# Patient Record
Sex: FEMALE | Race: BLACK OR AFRICAN AMERICAN | NOT HISPANIC OR LATINO | URBAN - METROPOLITAN AREA
[De-identification: names, ages, dates, MRNs, and addresses within clinical notes are randomized per-mention and may not be internally consistent; named-entity substitution may affect disease eponyms.]

---

## 2018-04-05 ENCOUNTER — EMERGENCY (EMERGENCY)
Facility: HOSPITAL | Age: 58
LOS: 1 days | Discharge: HOME | End: 2018-04-05
Attending: EMERGENCY MEDICINE | Admitting: EMERGENCY MEDICINE
Payer: SELF-PAY

## 2018-04-05 VITALS
OXYGEN SATURATION: 93 % | RESPIRATION RATE: 21 BRPM | HEART RATE: 70 BPM | SYSTOLIC BLOOD PRESSURE: 127 MMHG | DIASTOLIC BLOOD PRESSURE: 71 MMHG | TEMPERATURE: 98 F

## 2018-04-05 VITALS
OXYGEN SATURATION: 99 % | DIASTOLIC BLOOD PRESSURE: 79 MMHG | WEIGHT: 164.91 LBS | TEMPERATURE: 98 F | HEART RATE: 76 BPM | SYSTOLIC BLOOD PRESSURE: 128 MMHG | RESPIRATION RATE: 22 BRPM

## 2018-04-05 DIAGNOSIS — Z79.899 OTHER LONG TERM (CURRENT) DRUG THERAPY: ICD-10-CM

## 2018-04-05 DIAGNOSIS — F17.210 NICOTINE DEPENDENCE, CIGARETTES, UNCOMPLICATED: ICD-10-CM

## 2018-04-05 DIAGNOSIS — Z79.1 LONG TERM (CURRENT) USE OF NON-STEROIDAL ANTI-INFLAMMATORIES (NSAID): ICD-10-CM

## 2018-04-05 DIAGNOSIS — J45.909 UNSPECIFIED ASTHMA, UNCOMPLICATED: ICD-10-CM

## 2018-04-05 DIAGNOSIS — Z79.2 LONG TERM (CURRENT) USE OF ANTIBIOTICS: ICD-10-CM

## 2018-04-05 DIAGNOSIS — J45.901 UNSPECIFIED ASTHMA WITH (ACUTE) EXACERBATION: ICD-10-CM

## 2018-04-05 DIAGNOSIS — Z79.52 LONG TERM (CURRENT) USE OF SYSTEMIC STEROIDS: ICD-10-CM

## 2018-04-05 PROCEDURE — 71046 X-RAY EXAM CHEST 2 VIEWS: CPT

## 2018-04-05 PROCEDURE — 99284 EMERGENCY DEPT VISIT MOD MDM: CPT

## 2018-04-05 PROCEDURE — 94640 AIRWAY INHALATION TREATMENT: CPT

## 2018-04-05 PROCEDURE — 99285 EMERGENCY DEPT VISIT HI MDM: CPT | Mod: 25

## 2018-04-05 PROCEDURE — 71046 X-RAY EXAM CHEST 2 VIEWS: CPT | Mod: 26

## 2018-04-05 RX ORDER — IPRATROPIUM BROMIDE 0.2 MG/ML
2.5 SOLUTION, NON-ORAL INHALATION
Qty: 1 | Refills: 0 | OUTPATIENT
Start: 2018-04-05

## 2018-04-05 RX ORDER — ALBUTEROL 90 UG/1
3 AEROSOL, METERED ORAL
Qty: 3 | Refills: 0 | OUTPATIENT
Start: 2018-04-05 | End: 2018-05-04

## 2018-04-05 RX ORDER — ALBUTEROL 90 UG/1
1 AEROSOL, METERED ORAL ONCE
Qty: 0 | Refills: 0 | Status: COMPLETED | OUTPATIENT
Start: 2018-04-05 | End: 2018-04-05

## 2018-04-05 RX ORDER — ALBUTEROL 90 UG/1
2.5 AEROSOL, METERED ORAL
Qty: 0 | Refills: 0 | Status: COMPLETED | OUTPATIENT
Start: 2018-04-05 | End: 2018-04-05

## 2018-04-05 RX ORDER — ALBUTEROL 90 UG/1
1 AEROSOL, METERED ORAL
Qty: 1 | Refills: 0 | OUTPATIENT
Start: 2018-04-05

## 2018-04-05 RX ORDER — IPRATROPIUM BROMIDE 0.2 MG/ML
500 SOLUTION, NON-ORAL INHALATION ONCE
Qty: 0 | Refills: 0 | Status: COMPLETED | OUTPATIENT
Start: 2018-04-05 | End: 2018-04-05

## 2018-04-05 RX ORDER — IPRATROPIUM BROMIDE 0.2 MG/ML
2.5 SOLUTION, NON-ORAL INHALATION
Qty: 3 | Refills: 0 | OUTPATIENT
Start: 2018-04-05

## 2018-04-05 RX ADMIN — ALBUTEROL 1 PUFF(S): 90 AEROSOL, METERED ORAL at 23:13

## 2018-04-05 RX ADMIN — Medication 500 MICROGRAM(S): at 21:47

## 2018-04-05 RX ADMIN — ALBUTEROL 2.5 MILLIGRAM(S): 90 AEROSOL, METERED ORAL at 21:46

## 2018-04-05 RX ADMIN — ALBUTEROL 2.5 MILLIGRAM(S): 90 AEROSOL, METERED ORAL at 22:36

## 2018-04-05 RX ADMIN — ALBUTEROL 2.5 MILLIGRAM(S): 90 AEROSOL, METERED ORAL at 22:37

## 2018-04-05 RX ADMIN — Medication 50 MILLIGRAM(S): at 22:37

## 2018-04-05 NOTE — ED PROVIDER NOTE - OBJECTIVE STATEMENT
asthma exacerbation last week or so + usually smokes cigarettes ( counseled ) no fever + uses nebs at home but has none States remote Hx intubation , 2 ed visits over the last year and steroids x 2 as well

## 2018-04-05 NOTE — ED ADULT NURSE NOTE - OBJECTIVE STATEMENT
pt to ER w/ report of asthma exacerbation for two days w/ cough productive of green phlegm.  Pt states her chest feels tight.  Pt denies n/v/f/c.  Some wheezes noted on auscultation.  Neb tx given.  Will continue to monitor.

## 2018-04-05 NOTE — ED ADULT NURSE REASSESSMENT NOTE - NS ED NURSE REASSESS COMMENT FT1
Pt is A&Ox3 at this time. Pt states "I just need to get this phlegm out".  Pt states she continues to feel a tightness in chest but feels slight relief after treatments".  Pt stable at this time and PA notified of results from breathing treatment.  Will continue to monitor.

## 2018-04-05 NOTE — ED PROVIDER NOTE - ATTENDING CONTRIBUTION TO CARE
57 yof pw wheezing, hx of asthma, active smoker, no fever.  trial of nebs at home w/o significant relief.  no hx of PE, no leg pain or swelling    agree w/ PA, pt speaking in full sentences, wheezing, no tachypnea, will trial of nebs, steroids, reassess

## 2018-04-05 NOTE — ED ADULT TRIAGE NOTE - CHIEF COMPLAINT QUOTE
pt arrives complaining of SOB asthma exacerbation x 2 days worse today using inhaler without relief also complains of chest burning discomfort productive cough green sputum. peak flow 200

## 2018-04-18 ENCOUNTER — INPATIENT (INPATIENT)
Facility: HOSPITAL | Age: 58
LOS: 1 days | Discharge: HOME | DRG: 203 | End: 2018-04-20
Attending: INTERNAL MEDICINE | Admitting: INTERNAL MEDICINE
Payer: SELF-PAY

## 2018-04-18 VITALS
DIASTOLIC BLOOD PRESSURE: 75 MMHG | TEMPERATURE: 98 F | OXYGEN SATURATION: 98 % | RESPIRATION RATE: 20 BRPM | SYSTOLIC BLOOD PRESSURE: 133 MMHG | HEART RATE: 74 BPM

## 2018-04-18 DIAGNOSIS — I10 ESSENTIAL (PRIMARY) HYPERTENSION: ICD-10-CM

## 2018-04-18 DIAGNOSIS — R63.8 OTHER SYMPTOMS AND SIGNS CONCERNING FOOD AND FLUID INTAKE: ICD-10-CM

## 2018-04-18 DIAGNOSIS — J45.901 UNSPECIFIED ASTHMA WITH (ACUTE) EXACERBATION: ICD-10-CM

## 2018-04-18 DIAGNOSIS — J44.9 CHRONIC OBSTRUCTIVE PULMONARY DISEASE, UNSPECIFIED: ICD-10-CM

## 2018-04-18 DIAGNOSIS — Z29.9 ENCOUNTER FOR PROPHYLACTIC MEASURES, UNSPECIFIED: ICD-10-CM

## 2018-04-18 DIAGNOSIS — Z98.51 TUBAL LIGATION STATUS: Chronic | ICD-10-CM

## 2018-04-18 LAB
ALBUMIN SERPL ELPH-MCNC: 4.1 G/DL — SIGNIFICANT CHANGE UP (ref 3.3–5)
ALP SERPL-CCNC: 62 U/L — SIGNIFICANT CHANGE UP (ref 40–120)
ALT FLD-CCNC: 18 U/L — SIGNIFICANT CHANGE UP (ref 10–45)
ANION GAP SERPL CALC-SCNC: 10 MMOL/L — SIGNIFICANT CHANGE UP (ref 5–17)
APTT BLD: 31.5 SEC — SIGNIFICANT CHANGE UP (ref 27.5–37.4)
AST SERPL-CCNC: 17 U/L — SIGNIFICANT CHANGE UP (ref 10–40)
BASOPHILS NFR BLD AUTO: 0.2 % — SIGNIFICANT CHANGE UP (ref 0–2)
BILIRUB SERPL-MCNC: 0.4 MG/DL — SIGNIFICANT CHANGE UP (ref 0.2–1.2)
BUN SERPL-MCNC: 9 MG/DL — SIGNIFICANT CHANGE UP (ref 7–23)
CALCIUM SERPL-MCNC: 9.2 MG/DL — SIGNIFICANT CHANGE UP (ref 8.4–10.5)
CHLORIDE SERPL-SCNC: 104 MMOL/L — SIGNIFICANT CHANGE UP (ref 96–108)
CK MB CFR SERPL CALC: 2.3 NG/ML — SIGNIFICANT CHANGE UP (ref 0–6.7)
CK SERPL-CCNC: 216 U/L — HIGH (ref 25–170)
CO2 SERPL-SCNC: 29 MMOL/L — SIGNIFICANT CHANGE UP (ref 22–31)
CREAT SERPL-MCNC: 0.91 MG/DL — SIGNIFICANT CHANGE UP (ref 0.5–1.3)
EOSINOPHIL NFR BLD AUTO: 2.1 % — SIGNIFICANT CHANGE UP (ref 0–6)
GAS PNL BLDV: SIGNIFICANT CHANGE UP
GLUCOSE BLDC GLUCOMTR-MCNC: 198 MG/DL — HIGH (ref 70–99)
GLUCOSE BLDC GLUCOMTR-MCNC: 280 MG/DL — HIGH (ref 70–99)
GLUCOSE BLDC GLUCOMTR-MCNC: 280 MG/DL — HIGH (ref 70–99)
GLUCOSE SERPL-MCNC: 104 MG/DL — HIGH (ref 70–99)
HCT VFR BLD CALC: 38.7 % — SIGNIFICANT CHANGE UP (ref 34.5–45)
HGB BLD-MCNC: 12.1 G/DL — SIGNIFICANT CHANGE UP (ref 11.5–15.5)
INR BLD: 0.97 — SIGNIFICANT CHANGE UP (ref 0.88–1.16)
LYMPHOCYTES # BLD AUTO: 33.5 % — SIGNIFICANT CHANGE UP (ref 13–44)
MCHC RBC-ENTMCNC: 21.3 PG — LOW (ref 27–34)
MCHC RBC-ENTMCNC: 31.3 G/DL — LOW (ref 32–36)
MCV RBC AUTO: 68.3 FL — LOW (ref 80–100)
MONOCYTES NFR BLD AUTO: 8.1 % — SIGNIFICANT CHANGE UP (ref 2–14)
NEUTROPHILS NFR BLD AUTO: 56.1 % — SIGNIFICANT CHANGE UP (ref 43–77)
PLATELET # BLD AUTO: 233 K/UL — SIGNIFICANT CHANGE UP (ref 150–400)
POTASSIUM SERPL-MCNC: 3.4 MMOL/L — LOW (ref 3.5–5.3)
POTASSIUM SERPL-SCNC: 3.4 MMOL/L — LOW (ref 3.5–5.3)
PROT SERPL-MCNC: 7.2 G/DL — SIGNIFICANT CHANGE UP (ref 6–8.3)
PROTHROM AB SERPL-ACNC: 10.8 SEC — SIGNIFICANT CHANGE UP (ref 9.8–12.7)
RBC # BLD: 5.67 M/UL — HIGH (ref 3.8–5.2)
RBC # FLD: 15.1 % — SIGNIFICANT CHANGE UP (ref 10.3–16.9)
SODIUM SERPL-SCNC: 143 MMOL/L — SIGNIFICANT CHANGE UP (ref 135–145)
TROPONIN T SERPL-MCNC: <0.01 NG/ML — SIGNIFICANT CHANGE UP (ref 0–0.01)
WBC # BLD: 6.1 K/UL — SIGNIFICANT CHANGE UP (ref 3.8–10.5)
WBC # FLD AUTO: 6.1 K/UL — SIGNIFICANT CHANGE UP (ref 3.8–10.5)

## 2018-04-18 PROCEDURE — 71046 X-RAY EXAM CHEST 2 VIEWS: CPT | Mod: 26

## 2018-04-18 PROCEDURE — 71275 CT ANGIOGRAPHY CHEST: CPT | Mod: 26

## 2018-04-18 PROCEDURE — 99223 1ST HOSP IP/OBS HIGH 75: CPT | Mod: GC

## 2018-04-18 PROCEDURE — 93010 ELECTROCARDIOGRAM REPORT: CPT

## 2018-04-18 PROCEDURE — 99285 EMERGENCY DEPT VISIT HI MDM: CPT | Mod: 25

## 2018-04-18 RX ORDER — GLUCAGON INJECTION, SOLUTION 0.5 MG/.1ML
1 INJECTION, SOLUTION SUBCUTANEOUS ONCE
Qty: 0 | Refills: 0 | Status: DISCONTINUED | OUTPATIENT
Start: 2018-04-18 | End: 2018-04-20

## 2018-04-18 RX ORDER — AZITHROMYCIN 500 MG/1
500 TABLET, FILM COATED ORAL ONCE
Qty: 0 | Refills: 0 | Status: COMPLETED | OUTPATIENT
Start: 2018-04-18 | End: 2018-04-18

## 2018-04-18 RX ORDER — SODIUM CHLORIDE 9 MG/ML
1000 INJECTION, SOLUTION INTRAVENOUS
Qty: 0 | Refills: 0 | Status: DISCONTINUED | OUTPATIENT
Start: 2018-04-18 | End: 2018-04-20

## 2018-04-18 RX ORDER — IPRATROPIUM/ALBUTEROL SULFATE 18-103MCG
3 AEROSOL WITH ADAPTER (GRAM) INHALATION ONCE
Qty: 0 | Refills: 0 | Status: COMPLETED | OUTPATIENT
Start: 2018-04-18 | End: 2018-04-18

## 2018-04-18 RX ORDER — POTASSIUM CHLORIDE 20 MEQ
40 PACKET (EA) ORAL ONCE
Qty: 0 | Refills: 0 | Status: COMPLETED | OUTPATIENT
Start: 2018-04-18 | End: 2018-04-18

## 2018-04-18 RX ORDER — ACETAMINOPHEN WITH CODEINE 300MG-30MG
1 TABLET ORAL ONCE
Qty: 0 | Refills: 0 | Status: DISCONTINUED | OUTPATIENT
Start: 2018-04-18 | End: 2018-04-18

## 2018-04-18 RX ORDER — KETOROLAC TROMETHAMINE 30 MG/ML
15 SYRINGE (ML) INJECTION ONCE
Qty: 0 | Refills: 0 | Status: DISCONTINUED | OUTPATIENT
Start: 2018-04-18 | End: 2018-04-18

## 2018-04-18 RX ORDER — DEXTROSE 50 % IN WATER 50 %
25 SYRINGE (ML) INTRAVENOUS ONCE
Qty: 0 | Refills: 0 | Status: DISCONTINUED | OUTPATIENT
Start: 2018-04-18 | End: 2018-04-20

## 2018-04-18 RX ORDER — DEXTROSE 50 % IN WATER 50 %
12.5 SYRINGE (ML) INTRAVENOUS ONCE
Qty: 0 | Refills: 0 | Status: DISCONTINUED | OUTPATIENT
Start: 2018-04-18 | End: 2018-04-20

## 2018-04-18 RX ORDER — AMLODIPINE BESYLATE 2.5 MG/1
5 TABLET ORAL DAILY
Qty: 0 | Refills: 0 | Status: DISCONTINUED | OUTPATIENT
Start: 2018-04-18 | End: 2018-04-20

## 2018-04-18 RX ORDER — ACETAMINOPHEN 500 MG
650 TABLET ORAL EVERY 6 HOURS
Qty: 0 | Refills: 0 | Status: DISCONTINUED | OUTPATIENT
Start: 2018-04-18 | End: 2018-04-20

## 2018-04-18 RX ORDER — ALBUTEROL 90 UG/1
5 AEROSOL, METERED ORAL ONCE
Qty: 0 | Refills: 0 | Status: DISCONTINUED | OUTPATIENT
Start: 2018-04-18 | End: 2018-04-18

## 2018-04-18 RX ORDER — BUDESONIDE AND FORMOTEROL FUMARATE DIHYDRATE 160; 4.5 UG/1; UG/1
2 AEROSOL RESPIRATORY (INHALATION)
Qty: 0 | Refills: 0 | Status: DISCONTINUED | OUTPATIENT
Start: 2018-04-18 | End: 2018-04-20

## 2018-04-18 RX ORDER — INSULIN LISPRO 100/ML
VIAL (ML) SUBCUTANEOUS
Qty: 0 | Refills: 0 | Status: DISCONTINUED | OUTPATIENT
Start: 2018-04-18 | End: 2018-04-20

## 2018-04-18 RX ORDER — IPRATROPIUM BROMIDE 0.2 MG/ML
500 SOLUTION, NON-ORAL INHALATION ONCE
Qty: 0 | Refills: 0 | Status: DISCONTINUED | OUTPATIENT
Start: 2018-04-18 | End: 2018-04-18

## 2018-04-18 RX ORDER — DEXTROSE 50 % IN WATER 50 %
1 SYRINGE (ML) INTRAVENOUS ONCE
Qty: 0 | Refills: 0 | Status: DISCONTINUED | OUTPATIENT
Start: 2018-04-18 | End: 2018-04-20

## 2018-04-18 RX ORDER — IPRATROPIUM/ALBUTEROL SULFATE 18-103MCG
3 AEROSOL WITH ADAPTER (GRAM) INHALATION
Qty: 0 | Refills: 0 | Status: COMPLETED | OUTPATIENT
Start: 2018-04-18 | End: 2018-04-18

## 2018-04-18 RX ORDER — IPRATROPIUM/ALBUTEROL SULFATE 18-103MCG
3 AEROSOL WITH ADAPTER (GRAM) INHALATION EVERY 4 HOURS
Qty: 0 | Refills: 0 | Status: DISCONTINUED | OUTPATIENT
Start: 2018-04-18 | End: 2018-04-20

## 2018-04-18 RX ADMIN — Medication 15 MILLIGRAM(S): at 13:38

## 2018-04-18 RX ADMIN — Medication 15 MILLIGRAM(S): at 13:09

## 2018-04-18 RX ADMIN — Medication 3 MILLILITER(S): at 09:06

## 2018-04-18 RX ADMIN — Medication 3 MILLILITER(S): at 09:29

## 2018-04-18 RX ADMIN — Medication 1 TABLET(S): at 13:08

## 2018-04-18 RX ADMIN — Medication 2: at 16:48

## 2018-04-18 RX ADMIN — Medication 3 MILLILITER(S): at 10:01

## 2018-04-18 RX ADMIN — Medication 40 MILLIEQUIVALENT(S): at 15:59

## 2018-04-18 RX ADMIN — Medication 3 MILLILITER(S): at 08:52

## 2018-04-18 RX ADMIN — AZITHROMYCIN 500 MILLIGRAM(S): 500 TABLET, FILM COATED ORAL at 10:51

## 2018-04-18 RX ADMIN — Medication 15 MILLIGRAM(S): at 08:52

## 2018-04-18 RX ADMIN — Medication 650 MILLIGRAM(S): at 19:44

## 2018-04-18 RX ADMIN — Medication 3 MILLILITER(S): at 16:49

## 2018-04-18 RX ADMIN — Medication 1 TABLET(S): at 13:38

## 2018-04-18 RX ADMIN — Medication 3 MILLILITER(S): at 14:15

## 2018-04-18 RX ADMIN — AMLODIPINE BESYLATE 5 MILLIGRAM(S): 2.5 TABLET ORAL at 15:59

## 2018-04-18 RX ADMIN — Medication 40 MILLIGRAM(S): at 15:59

## 2018-04-18 RX ADMIN — Medication 3 MILLILITER(S): at 23:19

## 2018-04-18 RX ADMIN — BUDESONIDE AND FORMOTEROL FUMARATE DIHYDRATE 2 PUFF(S): 160; 4.5 AEROSOL RESPIRATORY (INHALATION) at 23:19

## 2018-04-18 RX ADMIN — Medication 125 MILLIGRAM(S): at 09:06

## 2018-04-18 NOTE — PATIENT PROFILE ADULT. - NS TRANSFER PATIENT BELONGINGS
Counseling and Referral of Other Preventative  (Italic type indicates deductible and co-insurance are waived)    Patient Name: Leonarda Almazan  Today's Date: 8/14/2017      SERVICE LIMITATIONS RECOMMENDATION    Vaccines    · Pneumococcal (once after 65)    · Influenza (annually)    · Hepatitis B (if medium/high risk)    · Prevnar 13      Hepatitis B medium/high risk factors:       - End-stage renal disease       - Hemophiliacs who received Factor VII or         IX concentrates       - Clients of institutions for the mentally             retarded       - Persons who live in the same house as          a HepB carrier       - Homosexual men       - Illicit injectable drug abusers     Pneumococcal: Done, no repeat necessary     Influenza: N/A     Hepatitis B: N/A     Prevnar 13: N/A will discuss with pulmonary, may have received     Prostate cancer screening (annually to age 75)     Prostate specific antigen (PSA) Shared decision making with Provider. Sometimes a co-pay may be required if the patient decides to have this test. The USPSTF no longer recommends prostate cancer screening routinely in medicine: not to follow    Colorectal cancer screening (to age 75)    · Fecal occult blood test (annual)  · Flexible sigmoidoscopy (5y)  · Screening colonoscopy (10y)  · Barium enema   N/A    Diabetes self-management training (no USPSTF recommendations)  Requires referral by treating physician for patient with diabetes or renal disease. 10 hours of initial DSMT sessions of no less than 30 minutes each in a continuous 12-month period. 2 hours of follow-up DSMT in subsequent years.  N/A    Glaucoma screening (no USPSTF recommendation)  Diabetes mellitus, family history   , age 50 or over    American, age 65 or over  Recommend follow up with eye care professional regularly    Medical nutrition therapy for diabetes or renal disease (no recommended schedule)  Requires referral by treating physician for patient with  diabetes or renal disease or kidney transplant within the past 3 years.  Can be provided in same year as diabetes self-management training (DSMT), and CMS recommends medical nutrition therapy take place after DSMT. Up to 3 hours for initial year and 2 hours in subsequent years.  N/A    Cardiovascular screening blood tests (every 5 years)  · Fasting lipid panel  Order as a panel if possible  Done this year, repeat every year    Diabetes screening tests (at least every 3 years, Medicare covers annually or at 6-month intervals for prediabetic patients)  · Fasting blood sugar (FBS) or glucose tolerance test (GTT)  Patient must be diagnosed with one of the following:       - Hypertension       - Dyslipidemia       - Obesity (BMI 30kg/m2)       - Previous elevated impaired FBS or GTT       ... or any two of the following:       - Overweight (BMI 25 but <30)       - Family history of diabetes       - Age 65 or older       - History of gestational diabetes or birth of baby weighing more than 9 pounds  Done this year, repeat every year    Abdominal aortic aneurysm screening (once)  · Sonogram   Limited to patients who meet one of the following criteria:       - Men who are 65-75 years old and have smoked more than 100 cigarette in their lifetime       - Anyone with a family history of abdominal aortic aneurysm       - Anyone recommended for screening by the USPSTF  N/A    HIV screening (annually for increased risk patients)  · HIV-1 and HIV-2 by EIA, or [place], rapid antibody test or oral mucosa transudate  Patients must be at increased risk for HIV infection per USPSTF guidelines or pregnant. Tests covered annually for patient at increased risk or as requested by the patient. Pregnant patients may receive up to 3 tests during pregnancy.  Risks discussed, screening is not recommended    Smoking cessation counseling (up to 8 sessions per year)  Patients must be asymptomatic of tobacco-related conditions to receive as a  preventative service.  Non-smoker    Subsequent annual wellness visit  At least 12 months since last AWV  Return in one year     The following information is provided to all patients.  This information is to help you find resources for any of the problems found today that may be affecting your health:                Living healthy guide: www.[facility].[place].[facility]      Understanding Diabetes: www.diabetes.org      Eating healthy: www.cdc.gov/healthyweight      CDC home safety checklist: www.cdc.gov/steadi/patient.html      Agency on Aging: www.goea.[place].[facility]      Alcoholics anonymous (AA): www.aa.org      Physical Activity: www.lara.nih.gov/wa9qerw      Tobacco use: www.quitwithusla.org      Cell Phone/PDA (specify)/Clothing

## 2018-04-18 NOTE — H&P ADULT - ATTENDING COMMENTS
Admitted for acute asthma exacerbation, might be related to recent smoking cessation, although not consistent histories given to HS and myself.  Might have a component of underlying COPD, needs full PFT's as outpatient once episode has resolved.  C/w prednisone for now, nebs ATC and added symbicort.  BMI of 30 --> Obesity.  Monitor BP now that norvasc has been added. Admitted for acute asthma exacerbation, might be related to recent smoking cessation, although not consistent histories given to HS and myself.  Might have a component of underlying COPD, needs full PFT's as outpatient once episode has resolved.  C/w prednisone for now, nebs ATC and added symbicort. Check peak flow.  BMI of 30 --> Obesity.  Monitor BP now that norvasc has been added.

## 2018-04-18 NOTE — ED PROVIDER NOTE - ATTENDING CONTRIBUTION TO CARE
58F with PMH of asthma/ COPD, remote intubation, smoker p/w L sided chest pain and SOB 2 weeks, was here on 4/5 with sob/chest pain, diagnosed as asthma/copd exac, given pred and nebs with relief and dcd from ED but p/w persistent and worsening sxs despite completing steroids at home. No fevers/ chills. Pt AAO, tachypneic, scattered wheezes b/l with decreased BS b/l bases. Tachycardic, Abd: soft/NT. Labs/ studies noted. Pt with minimal improvement despite nebs/ steroids. Admitted for asthma/ COPD exacerbation.

## 2018-04-18 NOTE — ED ADULT TRIAGE NOTE - OTHER COMPLAINTS
Pt c/o chest pain when breathing. Reports being here a last week, sent home with steroids for her asthma.

## 2018-04-18 NOTE — H&P ADULT - HISTORY OF PRESENT ILLNESS
59yo F w/ PMHx of asthma, every day smoker (30 pack year smoking hx) presents for SOB and chest tightness. 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 Of note, she was recently seen in the ED for similar complaints on 4/5/18 and was dc'ed with 5 days of prednisone 50mg, ventolin HFA inhaler, but pt states that her SOB and chest tightness has not improved. She has been intubated once before many years ago. She is a current every-day smoker, who has smoked about a pack per day for 30 years. She denied ETOH use, drug use. She denies recent travel hx, no sick contacts. She denies ever being diagnosed with COPD. Her only medication is an albuterol inhaler, which she has been using everyday for the past 1-2 weeks due to her symptoms. Her PCP is Dr. Nirav Mayer (PCP), however her last appointment was in March 2017 and has repeatedly not responded to office calls and has missed appointments. Per her PCP's office, her medications in March 2017 included paroxetine 30mg qday, buspirone 10mg qday for major depressive d/o, albuterol inhaler.     In the ED, T 98.3F, HR 70, -144/77, RR 20, 92% RA --> 96% on 2L NC. Was given azithromycin 500mg x1, nebs x2, toradol 15mg IVP x2, and solumedrol 125mg x1. Labs unremarkable. CTA chest was performed which was neg for PE.

## 2018-04-18 NOTE — ED ADULT NURSE NOTE - OBJECTIVE STATEMENT
Pt is a 58 female complaining of chest pain 10/10 and sob for the past two weeks. Pt states that she was in the ED last week and told that her symptoms were from asthma. PT was given steroids. Pt states that she did not get better. Pt states that she has chest pain that travels to her back  when breathing. Pt states that she has a cough and is bringing up yellow sputum. Pt states that she is nauseas but thinks it is do to her coughing. Pt has been intubated in the past do to asthma. PT denies vomiting, diarrhea, fever chills. Will continue to monitor.

## 2018-04-18 NOTE — ED PROVIDER NOTE - CARE PLAN
Principal Discharge DX:	Chest pain, unspecified type Principal Discharge DX:	COPD (chronic obstructive pulmonary disease) with acute bronchitis  Secondary Diagnosis:	Chest pain, unspecified type

## 2018-04-18 NOTE — H&P ADULT - PROBLEM SELECTOR PLAN 1
Pt being admitted for asthma exacerbation with possible COPD exacerbation most likely 2/2 suboptimal therapy   - per PCP's office (Dr. Nirav Mayer), pt was last seen March 2017 and has missed appointments and not responded to phone calls and was last rx'ed albuterol in May 2017  - no sick contacts, no recent travel hx  - s/p 125mg solumedrol x1  - will continue 40mg prednisone qday, and taper as tolerates  - c/w duoneb q4h

## 2018-04-18 NOTE — ED PROVIDER NOTE - OBJECTIVE STATEMENT
58F with pmh of asthma, remote intubation, smoker    here with L sided chest pain x 1-2 weeks, was here on 4/5 with sob/chest pain, diagnosed as asthma/copd exac, given pred burst/nebs with relief, however pain did not subside despite completing her pred burst, and still with yellow sputum cough. Pain now worse with deep inspiration, coughing so came back to ED.     Denies f/c, abd pain, n/v/c/d, urinary complaints, leg swelling, recent travel/surgeries, hx of pe/dvt, hemoptysis, ivdu, hx of ca, ocp use.

## 2018-04-18 NOTE — H&P ADULT - PROBLEM SELECTOR PLAN 2
Pt has a 30 pack year smoking h/o and is a current ever-day smoker  - never diagnosed with COPD by PFTs  - will need outpt PFTs as pt most likely has COPD given symptoms and clinical hx  - will start symbicort for maintenance therapy

## 2018-04-18 NOTE — H&P ADULT - NSHPPHYSICALEXAM_GEN_ALL_CORE
.  VITAL SIGNS:  T(C): 37 (04-18-18 @ 14:09), Max: 37 (04-18-18 @ 14:09)  T(F): 98.6 (04-18-18 @ 14:09), Max: 98.6 (04-18-18 @ 14:09)  HR: 78 (04-18-18 @ 14:09) (69 - 78)  BP: 142/70 (04-18-18 @ 14:09) (120/86 - 149/77)  BP(mean): --  RR: 18 (04-18-18 @ 14:09) (18 - 20)  SpO2: 98% (04-18-18 @ 14:09) (92% - 98%)  Wt(kg): --    PHYSICAL EXAM:    Constitutional: WDWN resting comfortably in bed; NAD, exhaling with pursed lips intermittently, but speaking in full sentences without issue. no use of respiratory accessory muscles   Head: NC/AT  Eyes: PERRL, EOMI, anicteric sclera  ENT: no nasal discharge; uvula midline, no oropharyngeal erythema or exudates; MMM  Neck: supple; no JVD or thyromegaly  Respiratory: poor aeration throughout, no wheezes   Cardiac: +S1/S2; RRR; no M/R/G; PMI non-displaced  Gastrointestinal: soft, NT/ND; no rebound or guarding; +BSx4  Extremities: WWP, no clubbing or cyanosis; no peripheral edema  Musculoskeletal: NROM x4; no joint swelling, tenderness or erythema  Vascular: 2+ radial, DP pulses B/L  Dermatologic: skin warm, dry and intact; no rashes, wounds, or scars  Lymphatic: no submandibular or cervical LAD  Neurologic: AAOx3; CNII-XII grossly intact; no focal deficits  Psychiatric: affect and characteristics of appearance, verbalizations, behaviors are appropriate

## 2018-04-18 NOTE — H&P ADULT - ASSESSMENT
57yo F w/ PMHx of asthma, every day smoker (30 pack year smoking hx) presents for SOB and chest tightness, admitted for asthma exacerbation. 59yo F w/ PMHx of asthma, every day smoker (30 pack year smoking hx) presents for SOB and chest tightness, admitted for asthma exacerbation with possible concomitant COPD exacerbation.

## 2018-04-18 NOTE — ED PROVIDER NOTE - MEDICAL DECISION MAKING DETAILS
58F with pmh of asthma here with L sided chest pain and sob, appears anxious. Given continued smoking use will r/o acs, PNA, PTX, lower suspicion for PE at this time. Plan for EKG, CXR, pain control, labs with trop x1 and venous blood gas, duonebs, reassess.

## 2018-04-18 NOTE — ED ADULT NURSE REASSESSMENT NOTE - NS ED NURSE REASSESS COMMENT FT1
Pt resting in bed. Pt states that her pain level has decreased to 2/10. Vitals as per flow sheet. PA notified 2L nasal cannula applied. Will continue to monitor. Pending CTA.
Pts O2 sat 96% on 2L.

## 2018-04-18 NOTE — H&P ADULT - NSHPLABSRESULTS_GEN_ALL_CORE
.  LABS:                         12.1   6.1   )-----------( 233      ( 18 Apr 2018 08:43 )             38.7     04-18    143  |  104  |  9   ----------------------------<  104<H>  3.4<L>   |  29  |  0.91    Ca    9.2      18 Apr 2018 08:43    TPro  7.2  /  Alb  4.1  /  TBili  0.4  /  DBili  x   /  AST  17  /  ALT  18  /  AlkPhos  62  04-18    PT/INR - ( 18 Apr 2018 08:43 )   PT: 10.8 sec;   INR: 0.97          PTT - ( 18 Apr 2018 08:43 )  PTT:31.5 sec    CARDIAC MARKERS ( 18 Apr 2018 08:43 )  x     / <0.01 ng/mL / 216 U/L / x     / 2.3 ng/mL            RADIOLOGY, EKG & ADDITIONAL TESTS: Reviewed.

## 2018-04-19 LAB
ANION GAP SERPL CALC-SCNC: 11 MMOL/L — SIGNIFICANT CHANGE UP (ref 5–17)
BUN SERPL-MCNC: 12 MG/DL — SIGNIFICANT CHANGE UP (ref 7–23)
CALCIUM SERPL-MCNC: 9.3 MG/DL — SIGNIFICANT CHANGE UP (ref 8.4–10.5)
CHLORIDE SERPL-SCNC: 104 MMOL/L — SIGNIFICANT CHANGE UP (ref 96–108)
CHOLEST SERPL-MCNC: 186 MG/DL — SIGNIFICANT CHANGE UP (ref 10–199)
CO2 SERPL-SCNC: 26 MMOL/L — SIGNIFICANT CHANGE UP (ref 22–31)
CREAT SERPL-MCNC: 0.73 MG/DL — SIGNIFICANT CHANGE UP (ref 0.5–1.3)
GLUCOSE BLDC GLUCOMTR-MCNC: 151 MG/DL — HIGH (ref 70–99)
GLUCOSE BLDC GLUCOMTR-MCNC: 154 MG/DL — HIGH (ref 70–99)
GLUCOSE BLDC GLUCOMTR-MCNC: 156 MG/DL — HIGH (ref 70–99)
GLUCOSE BLDC GLUCOMTR-MCNC: 179 MG/DL — HIGH (ref 70–99)
GLUCOSE BLDC GLUCOMTR-MCNC: 183 MG/DL — HIGH (ref 70–99)
GLUCOSE SERPL-MCNC: 176 MG/DL — HIGH (ref 70–99)
HBA1C BLD-MCNC: 5.6 % — SIGNIFICANT CHANGE UP (ref 4–5.6)
HCT VFR BLD CALC: 35.1 % — SIGNIFICANT CHANGE UP (ref 34.5–45)
HCV AB S/CO SERPL IA: 0.24 S/CO — SIGNIFICANT CHANGE UP
HCV AB SERPL-IMP: SIGNIFICANT CHANGE UP
HDLC SERPL-MCNC: 52 MG/DL — SIGNIFICANT CHANGE UP (ref 40–125)
HGB BLD-MCNC: 11 G/DL — LOW (ref 11.5–15.5)
LIPID PNL WITH DIRECT LDL SERPL: 118 MG/DL — SIGNIFICANT CHANGE UP
MAGNESIUM SERPL-MCNC: 2.2 MG/DL — SIGNIFICANT CHANGE UP (ref 1.6–2.6)
MCHC RBC-ENTMCNC: 21.4 PG — LOW (ref 27–34)
MCHC RBC-ENTMCNC: 31.3 G/DL — LOW (ref 32–36)
MCV RBC AUTO: 68.2 FL — LOW (ref 80–100)
PLATELET # BLD AUTO: 236 K/UL — SIGNIFICANT CHANGE UP (ref 150–400)
POTASSIUM SERPL-MCNC: 4 MMOL/L — SIGNIFICANT CHANGE UP (ref 3.5–5.3)
POTASSIUM SERPL-SCNC: 4 MMOL/L — SIGNIFICANT CHANGE UP (ref 3.5–5.3)
RAPID RVP RESULT: SIGNIFICANT CHANGE UP
RBC # BLD: 5.15 M/UL — SIGNIFICANT CHANGE UP (ref 3.8–5.2)
RBC # FLD: 14.9 % — SIGNIFICANT CHANGE UP (ref 10.3–16.9)
SODIUM SERPL-SCNC: 141 MMOL/L — SIGNIFICANT CHANGE UP (ref 135–145)
TOTAL CHOLESTEROL/HDL RATIO MEASUREMENT: 3.6 RATIO — SIGNIFICANT CHANGE UP (ref 3.3–7.1)
TRIGL SERPL-MCNC: 81 MG/DL — SIGNIFICANT CHANGE UP (ref 10–149)
WBC # BLD: 16.4 K/UL — HIGH (ref 3.8–10.5)
WBC # FLD AUTO: 16.4 K/UL — HIGH (ref 3.8–10.5)

## 2018-04-19 PROCEDURE — 99233 SBSQ HOSP IP/OBS HIGH 50: CPT | Mod: GC

## 2018-04-19 RX ORDER — INSULIN LISPRO 100/ML
8 VIAL (ML) SUBCUTANEOUS ONCE
Qty: 0 | Refills: 0 | Status: DISCONTINUED | OUTPATIENT
Start: 2018-04-19 | End: 2018-04-19

## 2018-04-19 RX ADMIN — BUDESONIDE AND FORMOTEROL FUMARATE DIHYDRATE 2 PUFF(S): 160; 4.5 AEROSOL RESPIRATORY (INHALATION) at 09:20

## 2018-04-19 RX ADMIN — Medication 3 MILLILITER(S): at 22:11

## 2018-04-19 RX ADMIN — Medication 650 MILLIGRAM(S): at 02:55

## 2018-04-19 RX ADMIN — Medication 3 MILLILITER(S): at 14:00

## 2018-04-19 RX ADMIN — Medication 3 MILLILITER(S): at 11:43

## 2018-04-19 RX ADMIN — BUDESONIDE AND FORMOTEROL FUMARATE DIHYDRATE 2 PUFF(S): 160; 4.5 AEROSOL RESPIRATORY (INHALATION) at 21:18

## 2018-04-19 RX ADMIN — Medication 650 MILLIGRAM(S): at 11:42

## 2018-04-19 RX ADMIN — AMLODIPINE BESYLATE 5 MILLIGRAM(S): 2.5 TABLET ORAL at 07:06

## 2018-04-19 RX ADMIN — Medication 3 MILLILITER(S): at 02:55

## 2018-04-19 RX ADMIN — Medication 6: at 00:25

## 2018-04-19 RX ADMIN — Medication 2: at 09:25

## 2018-04-19 RX ADMIN — Medication 3 MILLILITER(S): at 07:06

## 2018-04-19 RX ADMIN — Medication 650 MILLIGRAM(S): at 17:31

## 2018-04-19 RX ADMIN — Medication 2: at 18:42

## 2018-04-19 RX ADMIN — Medication 2: at 13:20

## 2018-04-19 RX ADMIN — Medication 40 MILLIGRAM(S): at 07:06

## 2018-04-19 RX ADMIN — Medication 2: at 22:11

## 2018-04-19 RX ADMIN — Medication 3 MILLILITER(S): at 17:31

## 2018-04-19 NOTE — PROGRESS NOTE ADULT - PROBLEM SELECTOR PLAN 1
- patient reports this admission and approximately 5 ER visits in the last year for asthma exacerbations. She uses her rescue inhaler daily and is not on any controller medications.   - no recent infection, chest xray clear. RVP negative.   - 40mg prednisone qday x5 days  - c/w duoneb q4h PRN SOB - patient reports this admission and approximately 5 ER visits in the last year for asthma exacerbations. She uses her rescue inhaler daily and is not on any controller medications.   - no recent infection, chest xray clear. RVP negative.   - 40mg prednisone qday x5 days  - c/w duoneb q4h ATC

## 2018-04-19 NOTE — PROGRESS NOTE ADULT - SUBJECTIVE AND OBJECTIVE BOX
INTERVAL HPI/OVERNIGHT EVENTS: LACIE o/n. Patient this AM w/ peak expiratory flow of 250. Still reports subjective wheeze w/ mild improvement in breathing. ROS otherwise negative.     VITAL SIGNS:  T(F): 98.8 (04-19-18 @ 10:43)  HR: 79 (04-19-18 @ 10:43)  BP: 131/74 (04-19-18 @ 10:43)  RR: 18 (04-19-18 @ 10:43)  SpO2: 98% (04-19-18 @ 10:43)  Wt(kg): --    PHYSICAL EXAM:    Constitutional: NAD.   ENMT: no scleral icterus, no conjunctival injection. MMM.   Neck: trachea midline, no LAD/JVD  Respiratory: scattered end expiratory wheezing. On 2L NC. No accessory muscle use. Speaking in full sentences.   Cardiovascular: RRR. nl s1/s2. no m/r/g  Gastrointestinal: BS+. NT/ND. obese.   Extremities: WWP. no le edema  Vascular: 2+ radial/dp/pt  Neurological: a&ox3, no focal deficits.     MEDICATIONS  (STANDING):  ALBUTerol/ipratropium for Nebulization 3 milliLiter(s) Nebulizer every 4 hours  amLODIPine   Tablet 5 milliGRAM(s) Oral daily  buDESOnide 160 MICROgram(s)/formoterol 4.5 MICROgram(s) Inhaler 2 Puff(s) Inhalation two times a day  dextrose 5%. 1000 milliLiter(s) (50 mL/Hr) IV Continuous <Continuous>  dextrose 50% Injectable 12.5 Gram(s) IV Push once  dextrose 50% Injectable 25 Gram(s) IV Push once  dextrose 50% Injectable 25 Gram(s) IV Push once  insulin lispro (HumaLOG) corrective regimen sliding scale   SubCutaneous Before meals and at bedtime  predniSONE   Tablet 40 milliGRAM(s) Oral daily    MEDICATIONS  (PRN):  acetaminophen   Tablet 650 milliGRAM(s) Oral every 6 hours PRN moderate pain  dextrose Gel 1 Dose(s) Oral once PRN Blood Glucose LESS THAN 70 milliGRAM(s)/deciliter  glucagon  Injectable 1 milliGRAM(s) IntraMuscular once PRN Glucose LESS THAN 70 milligrams/deciliter      Allergies    No Known Allergies    Intolerances        LABS:                        11.0   16.4  )-----------( 236      ( 19 Apr 2018 05:39 )             35.1     04-19    141  |  104  |  12  ----------------------------<  176<H>  4.0   |  26  |  0.73    Ca    9.3      19 Apr 2018 05:39  Mg     2.2     04-19    TPro  7.2  /  Alb  4.1  /  TBili  0.4  /  DBili  x   /  AST  17  /  ALT  18  /  AlkPhos  62  04-18    PT/INR - ( 18 Apr 2018 08:43 )   PT: 10.8 sec;   INR: 0.97          PTT - ( 18 Apr 2018 08:43 )  PTT:31.5 sec      RADIOLOGY & ADDITIONAL TESTS:    Xray Chest 2 Views PA/Lat (04.18.18 @ 09:27)   Findings: Lungs clear. No pleural effusion. Cardiomediastinal silhouette,   bones and soft tissues unremarkable.    Impression: Normal.

## 2018-04-19 NOTE — PROGRESS NOTE ADULT - ATTENDING COMMENTS
Seen and examined by me this afternoon.  Doing better but still coughing up thick phlegm, RVP is negative.  C/w present treatment. Plan for discharge tomorrow with Pulmonary f/up as outpatient.  Most likely has underlying COPD, currently treating for Asthma exacerbation.  Pt states she quit smoking but she still smokes one cigarette every 3 days, so still consider as active smoker, counseled about complete smoking cessation, doesn't want nicotine patches at this time.  Tolerating norvasc for HTN.  Anticipate d/c home tomorrow with PMD and Pulmonary follow up. Seen and examined by me this afternoon.  Doing better but still coughing up thick phlegm, RVP is negative.  C/w present treatment. Plan for discharge tomorrow with Pulmonary f/up as outpatient.  Most likely has underlying COPD, currently treating for Asthma exacerbation.  Pt states she quit smoking but she still smokes one cigarette every 3 days, so still consider as active smoker, counseled about complete smoking cessation, doesn't want nicotine patches at this time.  Tolerating norvasc for HTN.  Leukocytosis likely steroid related.  Hypokalemia corrected.  Anticipate d/c home tomorrow with PMD and Pulmonary follow up.

## 2018-04-20 VITALS
OXYGEN SATURATION: 100 % | HEART RATE: 67 BPM | DIASTOLIC BLOOD PRESSURE: 74 MMHG | SYSTOLIC BLOOD PRESSURE: 116 MMHG | TEMPERATURE: 98 F | RESPIRATION RATE: 18 BRPM

## 2018-04-20 LAB
GLUCOSE BLDC GLUCOMTR-MCNC: 149 MG/DL — HIGH (ref 70–99)
HCT VFR BLD CALC: 34.8 % — SIGNIFICANT CHANGE UP (ref 34.5–45)
HGB BLD-MCNC: 10.9 G/DL — LOW (ref 11.5–15.5)
MCHC RBC-ENTMCNC: 21.6 PG — LOW (ref 27–34)
MCHC RBC-ENTMCNC: 31.3 G/DL — LOW (ref 32–36)
MCV RBC AUTO: 68.9 FL — LOW (ref 80–100)
PLATELET # BLD AUTO: 233 K/UL — SIGNIFICANT CHANGE UP (ref 150–400)
RBC # BLD: 5.05 M/UL — SIGNIFICANT CHANGE UP (ref 3.8–5.2)
RBC # FLD: 15 % — SIGNIFICANT CHANGE UP (ref 10.3–16.9)
WBC # BLD: 13.3 K/UL — HIGH (ref 3.8–10.5)
WBC # FLD AUTO: 13.3 K/UL — HIGH (ref 3.8–10.5)

## 2018-04-20 PROCEDURE — 99239 HOSP IP/OBS DSCHRG MGMT >30: CPT

## 2018-04-20 RX ORDER — AMLODIPINE BESYLATE 2.5 MG/1
1 TABLET ORAL
Qty: 30 | Refills: 0 | OUTPATIENT
Start: 2018-04-20 | End: 2018-05-19

## 2018-04-20 RX ORDER — BUDESONIDE AND FORMOTEROL FUMARATE DIHYDRATE 160; 4.5 UG/1; UG/1
2 AEROSOL RESPIRATORY (INHALATION)
Qty: 1 | Refills: 3 | OUTPATIENT
Start: 2018-04-20 | End: 2018-08-17

## 2018-04-20 RX ADMIN — Medication 3 MILLILITER(S): at 11:16

## 2018-04-20 RX ADMIN — AMLODIPINE BESYLATE 5 MILLIGRAM(S): 2.5 TABLET ORAL at 07:01

## 2018-04-20 RX ADMIN — Medication 40 MILLIGRAM(S): at 07:01

## 2018-04-20 RX ADMIN — Medication 200 MILLIGRAM(S): at 09:20

## 2018-04-20 RX ADMIN — Medication 3 MILLILITER(S): at 07:01

## 2018-04-20 RX ADMIN — BUDESONIDE AND FORMOTEROL FUMARATE DIHYDRATE 2 PUFF(S): 160; 4.5 AEROSOL RESPIRATORY (INHALATION) at 09:22

## 2018-04-20 RX ADMIN — Medication 3 MILLILITER(S): at 03:13

## 2018-04-20 NOTE — DISCHARGE NOTE ADULT - PATIENT PORTAL LINK FT
You can access the [facility] Patient Portal, offered by [facility], by registering with the following website: [email or web address]

## 2018-04-20 NOTE — DISCHARGE NOTE ADULT - MEDICATION SUMMARY - MEDICATIONS TO TAKE
I will START or STAY ON the medications listed below when I get home from the hospital:    predniSONE 20 mg oral tablet  -- 2 tab(s) by mouth once a day  -- Indication: For Asthma Exacerbation     Ventolin HFA 90 mcg/inh inhalation aerosol  -- 1 puff(s) inhaled 4 times a day MDD:PRN wheezing please dispense 1 pump  -- For inhalation only.  It is very important that you take or use this exactly as directed.  Do not skip doses or discontinue unless directed by your doctor.  Obtain medical advice before taking any non-prescription drugs as some may affect the action of this medication.  Shake well before use.    -- Indication: For COPD    albuterol 2.5 mg/3 mL (0.083%) inhalation solution  -- 3 milliliter(s) inhaled every 4 hours, As Needed -for bronchospasm MDD:dispense 1 box or 30 day supply as appropriate please  -- For inhalation only.  It is very important that you take or use this exactly as directed.  Do not skip doses or discontinue unless directed by your doctor.  Obtain medical advice before taking any non-prescription drugs as some may affect the action of this medication.    -- Indication: For COPD    Symbicort 160 mcg-4.5 mcg/inh inhalation aerosol  -- 2 puff(s) inhaled 2 times a day   -- Indication: For COPD (chronic obstructive pulmonary disease)    amLODIPine 5 mg oral tablet  -- 1 tab(s) by mouth once a day  -- Indication: For Essential hypertension

## 2018-04-20 NOTE — DISCHARGE NOTE ADULT - ADDITIONAL INSTRUCTIONS
Please call the office of Dr. Killian to schedule a follow up appointment and establish care. You will need to have your insurance information to schedule the appointment. Please make an appointment within 2 weeks.

## 2018-04-20 NOTE — DISCHARGE NOTE ADULT - HOSPITAL COURSE
59yo F w/ PMHx of asthma, every day smoker (30 pack year smoking hx) presented for SOB and chest tightness. Patient does not routinely follow with a pulmonologist and infrequently sees her primary care physician. She has had multiple ED visits in addition to this admission for asthma exacerbations. She is an active smoker. Chest xray and RVP unremarkable. Patient initially placed on 2L NC but now on RA. Never febrile. No hx of COPD but has not had PFTs performed. Patient started on PO prednisone taper and initiated tx with symbicort to treat likely comorbid COPD. Patient educated about smoking cessation and inhalers. Discharged home with 5 day prednisone taper, symbicort inhaler, and PRN albuterol inhaler. Patient noted to be persistently hypertensive during admission and started on amlodipine 5 mg. Attempted to make follow up appointment with Dr. Killian but patient did not have her insurance information. Patient instructed to call Dr. Killian's office when discharged and schedule appointment to establish care. Patient verbalized understanding of her treatment plan. Understanding assessed via teach back. Appropriate inhaler technique demonstrated at bedside. Patient discharged on room air w/ no signs of respiratory distress.

## 2018-04-20 NOTE — DISCHARGE NOTE ADULT - MEDICATION SUMMARY - MEDICATIONS TO STOP TAKING
I will STOP taking the medications listed below when I get home from the hospital:    amoxicillin-clavulanate 875 mg-125 mg oral tablet  -- 1 tab(s) by mouth every 12 hours  -- Finish all this medication unless otherwise directed by prescriber.  Take with food or milk.    ibuprofen 600 mg oral tablet  -- 1 tab(s) by mouth 4 times a day, As Needed - for moderate pain  -- Do not take this drug if you are pregnant.  It is very important that you take or use this exactly as directed.  Do not skip doses or discontinue unless directed by your doctor.  May cause drowsiness or dizziness.  Obtain medical advice before taking any non-prescription drugs as some may affect the action of this medication.  Take with food or milk.    ipratropium 500 mcg/2.5 mL inhalation solution  -- 2.5 milliliter(s) inhaled 4 times a day MDD:please dispense 1 box for PRN use QID wheezing  -- For inhalation only.  It is very important that you take or use this exactly as directed.  Do not skip doses or discontinue unless directed by your doctor.    predniSONE 50 mg oral tablet  -- 1 tab(s) by mouth once a day  -- It is very important that you take or use this exactly as directed.  Do not skip doses or discontinue unless directed by your doctor.  Obtain medical advice before taking any non-prescription drugs as some may affect the action of this medication.  Take with food or milk.

## 2018-04-20 NOTE — DISCHARGE NOTE ADULT - CARE PROVIDER_API CALL
Chela Killian), Critical Care Medicine; Pulmonary Disease  [address] [id number]  Phone: [phone]  Fax: [phone]

## 2018-04-20 NOTE — DISCHARGE NOTE ADULT - PLAN OF CARE
Improvement in your breathing You were admitted for an asthma exacerbation. There were no signs of infection on your chest xray or on viral studies we performed. It is likely your recurrent asthma exacerbations are due to your continued smoking. Smoking cessation will improve your lung function and help you to better control your asthma and possible COPD. Please take all of your inhalers as prescribed. You do not have a formal diagnosis You do not have a formal diagnosis of COPD but it is likely given your smoking history that you have underlying COPD in addition to your asthma. You will need to call the office of Dr. Killian when you are discharged with your insurance information to schedule an appointment within two weeks. Please take your inhalers as prescribed. You were diagnosed with high blood pressure during this admission. It is very important to control your blood pressure to prevent worsening of heart diease and vascular disease. We started a medication while you were hospitalized, amlodipine, to control your blood pressure. Take it once a day. Follow up with your primary care doctor for further management of your blood pressure.

## 2018-04-20 NOTE — DISCHARGE NOTE ADULT - CARE PLAN
Principal Discharge DX:	Persistent asthma with acute exacerbation, unspecified asthma severity  Goal:	Improvement in your breathing  Assessment and plan of treatment:	You were admitted for an asthma exacerbation. There were no signs of infection on your chest xray or on viral studies we performed. It is likely your recurrent asthma exacerbations are due to your continued smoking. Smoking cessation will improve your lung function and help you to better control your asthma and possible COPD. Please take all of your inhalers as prescribed.  Secondary Diagnosis:	COPD (chronic obstructive pulmonary disease)  Assessment and plan of treatment:	You do not have a formal diagnosis  Secondary Diagnosis:	Essential hypertension Principal Discharge DX:	Persistent asthma with acute exacerbation, unspecified asthma severity  Goal:	Improvement in your breathing  Assessment and plan of treatment:	You were admitted for an asthma exacerbation. There were no signs of infection on your chest xray or on viral studies we performed. It is likely your recurrent asthma exacerbations are due to your continued smoking. Smoking cessation will improve your lung function and help you to better control your asthma and possible COPD. Please take all of your inhalers as prescribed.  Secondary Diagnosis:	COPD (chronic obstructive pulmonary disease)  Assessment and plan of treatment:	You do not have a formal diagnosis of COPD but it is likely given your smoking history that you have underlying COPD in addition to your asthma. You will need to call the office of Dr. Killian when you are discharged with your insurance information to schedule an appointment within two weeks. Please take your inhalers as prescribed.  Secondary Diagnosis:	Essential hypertension  Assessment and plan of treatment:	You were diagnosed with high blood pressure during this admission. It is very important to control your blood pressure to prevent worsening of heart diease and vascular disease. We started a medication while you were hospitalized, amlodipine, to control your blood pressure. Take it once a day. Follow up with your primary care doctor for further management of your blood pressure.

## 2018-04-23 DIAGNOSIS — J45.901 UNSPECIFIED ASTHMA WITH (ACUTE) EXACERBATION: ICD-10-CM

## 2018-04-23 DIAGNOSIS — F17.210 NICOTINE DEPENDENCE, CIGARETTES, UNCOMPLICATED: ICD-10-CM

## 2018-04-23 DIAGNOSIS — E87.6 HYPOKALEMIA: ICD-10-CM

## 2018-04-23 DIAGNOSIS — I10 ESSENTIAL (PRIMARY) HYPERTENSION: ICD-10-CM

## 2018-04-23 DIAGNOSIS — T38.0X5A ADVERSE EFFECT OF GLUCOCORTICOIDS AND SYNTHETIC ANALOGUES, INITIAL ENCOUNTER: ICD-10-CM

## 2018-04-23 DIAGNOSIS — E66.9 OBESITY, UNSPECIFIED: ICD-10-CM

## 2018-04-23 DIAGNOSIS — J44.9 CHRONIC OBSTRUCTIVE PULMONARY DISEASE, UNSPECIFIED: ICD-10-CM

## 2018-04-23 DIAGNOSIS — D72.829 ELEVATED WHITE BLOOD CELL COUNT, UNSPECIFIED: ICD-10-CM

## 2018-04-23 DIAGNOSIS — Z79.52 LONG TERM (CURRENT) USE OF SYSTEMIC STEROIDS: ICD-10-CM

## 2018-05-23 PROCEDURE — 85027 COMPLETE CBC AUTOMATED: CPT

## 2018-05-23 PROCEDURE — 83735 ASSAY OF MAGNESIUM: CPT

## 2018-05-23 PROCEDURE — 80061 LIPID PANEL: CPT

## 2018-05-23 PROCEDURE — 82553 CREATINE MB FRACTION: CPT

## 2018-05-23 PROCEDURE — 36415 COLL VENOUS BLD VENIPUNCTURE: CPT

## 2018-05-23 PROCEDURE — 83036 HEMOGLOBIN GLYCOSYLATED A1C: CPT

## 2018-05-23 PROCEDURE — 93005 ELECTROCARDIOGRAM TRACING: CPT

## 2018-05-23 PROCEDURE — 85610 PROTHROMBIN TIME: CPT

## 2018-05-23 PROCEDURE — 84132 ASSAY OF SERUM POTASSIUM: CPT

## 2018-05-23 PROCEDURE — 87798 DETECT AGENT NOS DNA AMP: CPT

## 2018-05-23 PROCEDURE — 82330 ASSAY OF CALCIUM: CPT

## 2018-05-23 PROCEDURE — 80048 BASIC METABOLIC PNL TOTAL CA: CPT

## 2018-05-23 PROCEDURE — 94640 AIRWAY INHALATION TREATMENT: CPT

## 2018-05-23 PROCEDURE — 82962 GLUCOSE BLOOD TEST: CPT

## 2018-05-23 PROCEDURE — 71275 CT ANGIOGRAPHY CHEST: CPT

## 2018-05-23 PROCEDURE — 87486 CHLMYD PNEUM DNA AMP PROBE: CPT

## 2018-05-23 PROCEDURE — 86803 HEPATITIS C AB TEST: CPT

## 2018-05-23 PROCEDURE — 87581 M.PNEUMON DNA AMP PROBE: CPT

## 2018-05-23 PROCEDURE — 80053 COMPREHEN METABOLIC PANEL: CPT

## 2018-05-23 PROCEDURE — 96375 TX/PRO/DX INJ NEW DRUG ADDON: CPT | Mod: XU

## 2018-05-23 PROCEDURE — 82550 ASSAY OF CK (CPK): CPT

## 2018-05-23 PROCEDURE — 82803 BLOOD GASES ANY COMBINATION: CPT

## 2018-05-23 PROCEDURE — 84295 ASSAY OF SERUM SODIUM: CPT

## 2018-05-23 PROCEDURE — 85025 COMPLETE CBC W/AUTO DIFF WBC: CPT

## 2018-05-23 PROCEDURE — 85730 THROMBOPLASTIN TIME PARTIAL: CPT

## 2018-05-23 PROCEDURE — 71046 X-RAY EXAM CHEST 2 VIEWS: CPT

## 2018-05-23 PROCEDURE — 96374 THER/PROPH/DIAG INJ IV PUSH: CPT | Mod: XU

## 2018-05-23 PROCEDURE — 99285 EMERGENCY DEPT VISIT HI MDM: CPT | Mod: 25

## 2018-05-23 PROCEDURE — 96376 TX/PRO/DX INJ SAME DRUG ADON: CPT | Mod: XU

## 2018-05-23 PROCEDURE — 87633 RESP VIRUS 12-25 TARGETS: CPT

## 2018-05-23 PROCEDURE — 84484 ASSAY OF TROPONIN QUANT: CPT

## 2018-08-08 ENCOUNTER — EMERGENCY (EMERGENCY)
Facility: HOSPITAL | Age: 58
LOS: 1 days | Discharge: HOME | End: 2018-08-08
Admitting: EMERGENCY MEDICINE
Payer: SELF-PAY

## 2018-08-08 VITALS
RESPIRATION RATE: 16 BRPM | DIASTOLIC BLOOD PRESSURE: 73 MMHG | HEART RATE: 78 BPM | TEMPERATURE: 98 F | OXYGEN SATURATION: 95 % | WEIGHT: 145.06 LBS | SYSTOLIC BLOOD PRESSURE: 120 MMHG

## 2018-08-08 DIAGNOSIS — Z98.51 TUBAL LIGATION STATUS: Chronic | ICD-10-CM

## 2018-08-08 PROCEDURE — 99283 EMERGENCY DEPT VISIT LOW MDM: CPT | Mod: 25

## 2018-08-08 PROCEDURE — 73080 X-RAY EXAM OF ELBOW: CPT

## 2018-08-08 PROCEDURE — 99283 EMERGENCY DEPT VISIT LOW MDM: CPT

## 2018-08-08 PROCEDURE — 73080 X-RAY EXAM OF ELBOW: CPT | Mod: 26,RT

## 2018-08-08 NOTE — ED PROVIDER NOTE - MEDICAL DECISION MAKING DETAILS
x ray results discussed ( olecranon spur with avulsion fx noted )  with supportive care and ortho f/u stressed

## 2018-08-08 NOTE — ED ADULT TRIAGE NOTE - CHIEF COMPLAINT QUOTE
pt complaining of 8/10 right elbow/ arm pain x 8 hrs when a bin full of groceries fell on her arm while at work. No obvious deformity, +radial pulse

## 2018-08-08 NOTE — ED ADULT NURSE NOTE - NSIMPLEMENTINTERV_GEN_ALL_ED
Implemented All Universal Safety Interventions:  [place] to call system. Call bell, personal items and telephone within reach. Instruct patient to call for assistance. Room bathroom lighting operational. Non-slip footwear when patient is off stretcher. Physically safe environment: no spills, clutter or unnecessary equipment. Stretcher in lowest position, wheels locked, appropriate side rails in place.

## 2018-08-08 NOTE — ED PROVIDER NOTE - PHYSICAL EXAMINATION
RUE with all compartments soft skin intact warm and pink pulses palpable and regular cap refill brisk ROM decreased at elbow with  15 -20 degree extensor lag -> 100 flexion 70/90 sup/pronation with tenderness at all extremes no gross deformity + RUM nerves intact M/S  FROM at shoulder / wrist/ hand

## 2018-08-08 NOTE — ED ADULT NURSE NOTE - CHPI ED NUR SYMPTOMS NEG
no abrasion/no weakness/no difficulty bearing weight/no tingling/no fever/no stiffness/no back pain/no numbness/no bruising

## 2018-08-08 NOTE — ED PROVIDER NOTE - OBJECTIVE STATEMENT
Right elbow pain as items fell onto her tonight and feels stiffness and difficulty with ROM. Denies numbness or tingling. no other injury

## 2018-08-12 DIAGNOSIS — M25.521 PAIN IN RIGHT ELBOW: ICD-10-CM

## 2018-08-12 DIAGNOSIS — Z79.899 OTHER LONG TERM (CURRENT) DRUG THERAPY: ICD-10-CM

## 2018-08-12 DIAGNOSIS — J45.909 UNSPECIFIED ASTHMA, UNCOMPLICATED: ICD-10-CM

## 2018-08-12 DIAGNOSIS — Z79.52 LONG TERM (CURRENT) USE OF SYSTEMIC STEROIDS: ICD-10-CM

## 2019-12-27 NOTE — DISCHARGE NOTE ADULT - ABILITY TO HEAR (WITH HEARING AID OR HEARING APPLIANCE IF NORMALLY USED):
Anesthesia Release from PACU Note    Patient: Samuel Miner    Procedure(s) Performed: Procedure(s) (LRB):  ESOPHAGOGASTRODUODENOSCOPY (EGD) (N/A)    Anesthesia type: GEN    Post pain: Adequate analgesia reported    Post assessment: no apparent anesthetic complications, tolerated procedure well and no evidence of recall    Post vital signs: BP (!) 98/58   Pulse (!) 124   Temp 36.7 °C (98.1 °F) (Temporal)   Resp 20   Wt 19.2 kg (42 lb 3.5 oz)   SpO2 98%     Level of consciousness: awake, alert and oriented    Nausea/Vomiting: no nausea/no vomiting    Complications: none    Airway Patency: patent    Respiratory: unassisted, spontaneous ventilation, room air    Cardiovascular: stable and blood pressure at baseline    Hydration: euvolemic    
Adequate: hears normal conversation without difficulty

## 2020-02-23 ENCOUNTER — EMERGENCY (EMERGENCY)
Facility: HOSPITAL | Age: 60
LOS: 1 days | Discharge: HOME | End: 2020-02-23
Admitting: EMERGENCY MEDICINE
Payer: SELF-PAY

## 2020-02-23 VITALS
SYSTOLIC BLOOD PRESSURE: 115 MMHG | RESPIRATION RATE: 18 BRPM | OXYGEN SATURATION: 100 % | DIASTOLIC BLOOD PRESSURE: 78 MMHG | TEMPERATURE: 98 F | HEART RATE: 78 BPM

## 2020-02-23 DIAGNOSIS — Z98.51 TUBAL LIGATION STATUS: Chronic | ICD-10-CM

## 2020-02-23 PROCEDURE — 99283 EMERGENCY DEPT VISIT LOW MDM: CPT | Mod: 25

## 2020-02-23 PROCEDURE — 73130 X-RAY EXAM OF HAND: CPT | Mod: 26

## 2020-02-23 PROCEDURE — 73130 X-RAY EXAM OF HAND: CPT | Mod: 26,LT

## 2020-02-23 PROCEDURE — 99284 EMERGENCY DEPT VISIT MOD MDM: CPT | Mod: 25

## 2020-02-23 PROCEDURE — 73130 X-RAY EXAM OF HAND: CPT

## 2020-02-23 NOTE — ED PROVIDER NOTE - CLINICAL SUMMARY MEDICAL DECISION MAKING FREE TEXT BOX
59y F presents to the ED with left hand pain/swelling. Patient states she hit it while driving at work today and a bump appeared. On physical exam patient appears well non toxic, has mildly tender cyst like structure over dorsum of left wrist over scaphoid bone. Discussed with hand surgeon who saw patient who believed ganglion cyst, not necessarily related to the trauma, and to follow up with hand surgeon for further evaluation. 59y F presents to the ED with left hand pain/swelling. Patient states she hit it while driving at work today and a bump appeared. On physical exam patient appears well non toxic, has mildly tender cyst like structure over dorsum of left wrist over scaphoid bone. Discussed with hand surgeon who saw patient who believed ganglion cyst, not necessarily related to the trauma, and to follow up with hand surgeon for further evaluation. Per my interpretation, imaging negative for fracture. o Patient is advised to return if any worsening of symptoms. Patient understands indications to return to the ER. Patient is agreeable with this plan.

## 2020-02-23 NOTE — ED ADULT NURSE NOTE - OBJECTIVE STATEMENT
59y F, A&ox3, pmh of Asthma, presents to ed c/o left hand injury at work today. Reports banged left hand on truck and since then c/o tingling sensation to arm. No obvious deformity noted. sensation intact. +radial pulse, cap refill <3. +ROM, strength equal bilateral.

## 2020-02-23 NOTE — ED PROVIDER NOTE - OBJECTIVE STATEMENT
59y F presents to the ED with complains of left hand pain. Patient states she banged her hand on the door at work and noticed a tender bump on the left hand. Patient states it was never there before. 59y F presents to the ED with complains of left hand pain. Patient states she banged her hand on the door at work and noticed a tender bump on the left hand. Patient states it was never there before. no numbness tingling or weakness.

## 2020-02-23 NOTE — ED PROVIDER NOTE - PHYSICAL EXAMINATION
General survey: Patient is well developed and well nourished. Patient is lying in stretcher, not diaphoretic and does not appear in acute distress.    Skin: Warm dry and intact. No note of any edema, pallor, jaundice, erythema, ecchymosis, or purpura    msk: ttp cyst like structure dorsum left hand overlying scaphoid. No evidence of gross deformity. Full ROM of fingers, flexion, extension, abduction and adduction. Intact thumb opposition. No TTP phalanges, metacarpals, carpals, styloid proccess, or snuffbox tenderness. No evidence of thenar or hypothenar atrophy. Capillary refill <2 sec.

## 2020-02-23 NOTE — ED PROVIDER NOTE - NSFOLLOWUPINSTRUCTIONS_ED_ALL_ED_FT
please use ice on the area    please follow up with your primary care doctor    please reach out to [place] (phone number included for follow up with hand surgeon)    Ganglion Cyst     A ganglion cyst is a non-cancerous, fluid-filled lump that occurs near a joint or tendon. The cyst grows out of a joint or the lining of a tendon. Ganglion cysts most often develop in the hand or wrist, but they can also develop in the shoulder, elbow, hip, knee, ankle, or foot.  Ganglion cysts are ball-shaped or egg-shaped. Their size can range from the size of a pea to larger than a grape. Increased activity may cause the cyst to get bigger because more fluid starts to build up.  What are the causes?  The exact cause of this condition is not known, but it may be related to:  Inflammation or irritation around the joint.An injury.Repetitive movements or overuse.Arthritis.What increases the risk?  You are more likely to develop this condition if:  You are a woman.You are 15–40 years old.What are the signs or symptoms?  The main symptom of this condition is a lump. It most often appears on the hand or wrist. In many cases, there are no other symptoms, but a cyst can sometimes cause:  Tingling.Pain.Numbness.Muscle weakness.Weak .Less range of motion in a joint.How is this diagnosed?  Ganglion cysts are usually diagnosed based on a physical exam. Your health care provider will feel the lump and may shine a light next to it. If it is a ganglion cyst, the light will likely shine through it.  Your health care provider may order an X-ray, ultrasound, or MRI to rule out other conditions.  How is this treated?  Ganglion cysts often go away on their own without treatment. If you have pain or other symptoms, treatment may be needed. Treatment is also needed if the ganglion cyst limits your movement or if it gets infected. Treatment may include:  Wearing a brace or splint on your wrist or finger.Taking anti-inflammatory medicine.Having fluid drained from the lump with a needle (aspiration).Getting a steroid injected into the joint.Having surgery to remove the ganglion cyst.Placing a pad on your shoe or wearing shoes that will not rub against the cyst if it is on your foot.Follow these instructions at home:  Do not press on the ganglion cyst, poke it with a needle, or hit it.Take over-the-counter and prescription medicines only as told by your health care provider.If you have a brace or splint:   Wear it as told by your health care provider.Remove it as told by your health care provider. Ask if you need to remove it when you take a shower or a bath.Watch your ganglion cyst for any changes.Keep all follow-up visits as told by your health care provider. This is important.Contact a health care provider if:  Your ganglion cyst becomes larger or more painful.You have pus coming from the lump.You have weakness or numbness in the affected area.You have a fever or chills.Get help right away if:  You have a fever and have any of these in the cyst area:  Increased redness.Red streaks.Swelling.Summary  A ganglion cyst is a non-cancerous, fluid-filled lump that occurs near a joint or tendon.Ganglion cysts most often develop in the hand or wrist, but they can also develop in the shoulder, elbow, hip, knee, ankle, or foot.Ganglion cysts often go away on their own without treatment.This information is not intended to replace advice given to you by your health care provider. Make sure you discuss any questions you have with your health care provider.    Document Released: 12/15/2001 Document Revised: 08/17/2018 Document Reviewed: 08/17/2018  Elsevier Interactive Patient Education © 2020 Elsevier Inc[employer]

## 2020-02-23 NOTE — ED PROVIDER NOTE - PATIENT PORTAL LINK FT
You can access the FollowMyHealth Patient Portal offered by [facility] by registering at the following website: [email or web address]. By joining [employer]’s FollowMyHealth portal, you will also be able to view your health information using other applications (apps) compatible with our system.

## 2020-02-23 NOTE — ED ADULT NURSE NOTE - CHPI ED NUR SYMPTOMS NEG
no stiffness/no deformity/no weakness/no numbness/no back pain/no difficulty bearing weight/no fever/no abrasion/no bruising
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
## 2020-02-28 DIAGNOSIS — M79.642 PAIN IN LEFT HAND: ICD-10-CM

## 2020-02-28 DIAGNOSIS — M25.432 EFFUSION, LEFT WRIST: ICD-10-CM
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
## 2020-05-20 NOTE — PATIENT PROFILE ADULT. - CENTRAL VENOUS CATHETER
The patient location is:  Patient Home in [place]  The chief complaint leading to consultation is: morbid obesity  Visit type: Virtual visit with synchronous audio and video  Total time spent with patient: 30 minutes  Each patient to whom he or she provides medical services by telemedicine is:  (1) informed of the relationship between the physician and patient and the respective role of any other health care provider with respect to management of the patient; and (2) notified that he or she may decline to receive medical services by telemedicine and may withdraw from such care at any time.  Nutrition Handout located in AVS section of pt's [facility] and/or sent as a message via [facility] portal.  Pt informed of handout and how to access this information in [employer] [facility].  NUTRITION NOTE    Referring Physician: Yuni Chaudhari MD  Reason for MNT Referral: 3 months Medically Supervised Diet pending Gastric Sleeve    Patient presents for 3rd visit for MSD with weight unknown due to pt not having a scale available over the past month; total weight loss by making numerous dietary and lifestyle changes.    CLINICAL DATA:  61 y.o. female.    Current Weight:  Unknown no scale  Weight Change Since Initial Visit: Unknown no scale available  Ideal Body Weight: 126 lbs  There is no height or weight on file to calculate BMI.   Last Weight: 191 lbs  Initial/ Last months weight: 188 lbs       NUTRITIONAL NEEDS:  ( For Bariatric Sx/ weight loss)  1198-0729 calories    Grams Protein     CURRENT DIET:  Reduced-calorie diet.      Current diet recall:  Diet Recall: Food records are not present.  Meal pattern- 3 meals:improved   Breakfast:  Hard boiled egg or raisin bran on occasion reports it keeps her regular coffee with sugar and cream; has tried truvia and does not like aftertaste  Snack:  Light yogurt or fruit  Lunch: Tuna salad - no bread  Dinner:  [place] or grilled chicken  Veggies  Snack:fruit  Tried vanilla protein  shake powder recently and like it     Current Diet:  Meal pattern: 3 - Improved   Protein supplements:   Drinking slim fast  High protein or vanilla protein powder with fruit  Snackin-2 / day   Vegetables: Likes a variety. Eats daily.  Fruits: Likes a variety. Eats almost daily.2 servings per day banana or apple  Beverages: water,   Dining out: Weekly. Reduced lately. Mostly fast food, and take-out. salad  Cooking at home: Daily. Mostly baked and grilled meat, fish,  and vegetables.  NO frying          Exercise:  Current exercise:  walking with grandchildren picking berries,  Restrictions to exercise: hip pain      Vitamins / Minerals / Herbs:   Vit D, melatonin     Labs:    no recent     Food Allergies:   Sometimes milk upsets stomach     Social:  Disabled.    Alcohol: Socially.  Wine on occasion  Smoking:  reports  quit smoking 3/2/2020 nicotine level came back as <3- smoke free.  Remains smoke free       ASSESSMENT:  Patient demonstrates all willingness to change lifestyle habits as evidenced by good exercise, dietary changes, including protein drinks, increased fruits, increased vegetables, reduced dining out, better choices when dining out, more food preparation at [employer], healthier cooking at home and healthier snacking at home.    Doing well with working on greatest challenges (sweets and starchy CHO).    Barriers to Education:  none  Stage of Change:  action    NUTRITION DIAGNOSIS:  Morbid Obesity related to Inadequate protein intake as evidence by BMI.  Status: Same    PLAN:  Pt is good candidate for surgery.  Pt was cleared on 3/2/2020.  Due to Covid pt has not been able to schedule her EGD to complete her work up    Diet: Maintain diet plan.  Start including protein supplements in the diet plan daily.  Return to clinic.    Exercise: Increase.    Behavior Modification: Begin to document food & activity logs daily.    Return to clinic in one month.  Needs additional visit(s) with ADRIENNE Schmidt  nutrition plan with bariatric team.    SESSION TIME:  30 minutes   no

## 2020-09-18 NOTE — ED PROVIDER NOTE - HISTORY ATTESTATION, MLM
Pneumococcal (Prevnar 13) 9/27/2019     AMS please advise if ok for pneumovax? I have reviewed and confirmed nurses' notes...

## 2020-10-14 NOTE — PROGRESS NOTE ADULT - ASSESSMENT
57yo F w/ PMHx of asthma, every day smoker (30 pack year smoking hx) presents for SOB and chest tightness, admitted for asthma exacerbation with possible concomitant COPD exacerbation. Staging Info: By selecting yes to the question above you will include information on AJCC 8 tumor staging in your Mohs note. Information on tumor staging will be automatically added for SCCs on the head and neck. AJCC 8 includes tumor size, tumor depth, perineural involvement and bone invasion.

## 2021-09-16 NOTE — ED ADULT TRIAGE NOTE - ESI TRIAGE ACUITY LEVEL, MLM
3 Detail Level: Detailed Depth Of Biopsy: dermis Was A Bandage Applied: Yes Size Of Lesion In Cm: 0 Biopsy Type: H and E Biopsy Method: double edge Personna blade Anesthesia Type: 1% lidocaine with epinephrine Anesthesia Volume In Cc (Will Not Render If 0): 0.5 Hemostasis: Clarisse's Wound Care: Petrolatum Dressing: bandage Destruction After The Procedure: No Type Of Destruction Used: Curettage Curettage Text: The wound bed was treated with curettage after the biopsy was performed. Cryotherapy Text: The wound bed was treated with cryotherapy after the biopsy was performed. Electrodesiccation Text: The wound bed was treated with electrodesiccation after the biopsy was performed. Electrodesiccation And Curettage Text: The wound bed was treated with electrodesiccation and curettage after the biopsy was performed. Silver Nitrate Text: The wound bed was treated with silver nitrate after the biopsy was performed. Lab: 6 Lab Facility: 3 Consent: Written consent was obtained and risks were reviewed including but not limited to scarring, infection, bleeding, scabbing, incomplete removal, nerve damage and allergy to anesthesia. Post-Care Instructions: I reviewed with the patient in detail post-care instructions. Patient is to keep the biopsy site dry overnight, and then apply vasoline twice daily until healed. Notification Instructions: Patient will be notified of biopsy results. However, patient instructed to call the office if not contacted within 2 weeks. Billing Type: Third-Party Bill Information: Selecting Yes will display possible errors in your note based on the variables you have selected. This validation is only offered as a suggestion for you. PLEASE NOTE THAT THE VALIDATION TEXT WILL BE REMOVED WHEN YOU FINALIZE YOUR NOTE. IF YOU WANT TO FAX A PRELIMINARY NOTE YOU WILL NEED TO TOGGLE THIS TO 'NO' IF YOU DO NOT WANT IT IN YOUR FAXED NOTE.

## 2021-10-07 ENCOUNTER — EMERGENCY (EMERGENCY)
Facility: HOSPITAL | Age: 61
LOS: 1 days | Discharge: HOME | End: 2021-10-07
Attending: EMERGENCY MEDICINE | Admitting: EMERGENCY MEDICINE
Payer: SELF-PAY

## 2021-10-07 VITALS
RESPIRATION RATE: 18 BRPM | OXYGEN SATURATION: 97 % | HEART RATE: 76 BPM | HEIGHT: 62 IN | WEIGHT: 154.98 LBS | TEMPERATURE: 99 F | SYSTOLIC BLOOD PRESSURE: 122 MMHG | DIASTOLIC BLOOD PRESSURE: 81 MMHG
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
## 2021-10-07 DIAGNOSIS — Z98.51 TUBAL LIGATION STATUS: ICD-10-CM

## 2021-10-07 DIAGNOSIS — S16.1XXA STRAIN OF MUSCLE, FASCIA AND TENDON AT NECK LEVEL, INITIAL ENCOUNTER: ICD-10-CM
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
## 2021-10-07 DIAGNOSIS — Z98.51 TUBAL LIGATION STATUS: Chronic | ICD-10-CM

## 2021-10-07 DIAGNOSIS — S09.90XA UNSPECIFIED INJURY OF HEAD, INITIAL ENCOUNTER: ICD-10-CM

## 2021-10-07 DIAGNOSIS — J45.909 UNSPECIFIED ASTHMA, UNCOMPLICATED: ICD-10-CM

## 2021-10-07 DIAGNOSIS — G43.909 MIGRAINE, UNSPECIFIED, NOT INTRACTABLE, WITHOUT STATUS MIGRAINOSUS: ICD-10-CM
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
## 2021-10-07 LAB
ALBUMIN SERPL ELPH-MCNC: 4.3 G/DL — SIGNIFICANT CHANGE UP (ref 3.3–5)
ALP SERPL-CCNC: 67 U/L — SIGNIFICANT CHANGE UP (ref 40–120)
ALT FLD-CCNC: 17 U/L — SIGNIFICANT CHANGE UP (ref 10–45)
ANION GAP SERPL CALC-SCNC: 9 MMOL/L — SIGNIFICANT CHANGE UP (ref 5–17)
AST SERPL-CCNC: 23 U/L — SIGNIFICANT CHANGE UP (ref 10–40)
BASOPHILS # BLD AUTO: 0.02 K/UL — SIGNIFICANT CHANGE UP (ref 0–0.2)
BASOPHILS NFR BLD AUTO: 0.4 % — SIGNIFICANT CHANGE UP (ref 0–2)
BILIRUB SERPL-MCNC: 0.5 MG/DL — SIGNIFICANT CHANGE UP (ref 0.2–1.2)
BUN SERPL-MCNC: 15 MG/DL — SIGNIFICANT CHANGE UP (ref 7–23)
CALCIUM SERPL-MCNC: 10 MG/DL — SIGNIFICANT CHANGE UP (ref 8.4–10.5)
CHLORIDE SERPL-SCNC: 105 MMOL/L — SIGNIFICANT CHANGE UP (ref 96–108)
CO2 SERPL-SCNC: 27 MMOL/L — SIGNIFICANT CHANGE UP (ref 22–31)
CREAT SERPL-MCNC: 1 MG/DL — SIGNIFICANT CHANGE UP (ref 0.5–1.3)
EOSINOPHIL # BLD AUTO: 0.06 K/UL — SIGNIFICANT CHANGE UP (ref 0–0.5)
EOSINOPHIL NFR BLD AUTO: 1.3 % — SIGNIFICANT CHANGE UP (ref 0–6)
GLUCOSE SERPL-MCNC: 98 MG/DL — SIGNIFICANT CHANGE UP (ref 70–99)
HCT VFR BLD CALC: 41.2 % — SIGNIFICANT CHANGE UP (ref 34.5–45)
HGB BLD-MCNC: 12.8 G/DL — SIGNIFICANT CHANGE UP (ref 11.5–15.5)
IMM GRANULOCYTES NFR BLD AUTO: 0.4 % — SIGNIFICANT CHANGE UP (ref 0–1.5)
LYMPHOCYTES # BLD AUTO: 2.04 K/UL — SIGNIFICANT CHANGE UP (ref 1–3.3)
LYMPHOCYTES # BLD AUTO: 44.5 % — HIGH (ref 13–44)
MCHC RBC-ENTMCNC: 22 PG — LOW (ref 27–34)
MCHC RBC-ENTMCNC: 31.1 GM/DL — LOW (ref 32–36)
MCV RBC AUTO: 70.7 FL — LOW (ref 80–100)
MONOCYTES # BLD AUTO: 0.37 K/UL — SIGNIFICANT CHANGE UP (ref 0–0.9)
MONOCYTES NFR BLD AUTO: 8.1 % — SIGNIFICANT CHANGE UP (ref 2–14)
NEUTROPHILS # BLD AUTO: 2.07 K/UL — SIGNIFICANT CHANGE UP (ref 1.8–7.4)
NEUTROPHILS NFR BLD AUTO: 45.3 % — SIGNIFICANT CHANGE UP (ref 43–77)
NRBC # BLD: 0 /100 WBCS — SIGNIFICANT CHANGE UP (ref 0–0)
PLATELET # BLD AUTO: 248 K/UL — SIGNIFICANT CHANGE UP (ref 150–400)
POTASSIUM SERPL-MCNC: 4.6 MMOL/L — SIGNIFICANT CHANGE UP (ref 3.5–5.3)
POTASSIUM SERPL-SCNC: 4.6 MMOL/L — SIGNIFICANT CHANGE UP (ref 3.5–5.3)
PROT SERPL-MCNC: 8 G/DL — SIGNIFICANT CHANGE UP (ref 6–8.3)
RBC # BLD: 5.83 M/UL — HIGH (ref 3.8–5.2)
RBC # FLD: 14.8 % — HIGH (ref 10.3–14.5)
SODIUM SERPL-SCNC: 141 MMOL/L — SIGNIFICANT CHANGE UP (ref 135–145)
WBC # BLD: 4.58 K/UL — SIGNIFICANT CHANGE UP (ref 3.8–10.5)
WBC # FLD AUTO: 4.58 K/UL — SIGNIFICANT CHANGE UP (ref 3.8–10.5)

## 2021-10-07 PROCEDURE — G1004: CPT

## 2021-10-07 PROCEDURE — 99284 EMERGENCY DEPT VISIT MOD MDM: CPT | Mod: 25

## 2021-10-07 PROCEDURE — 96375 TX/PRO/DX INJ NEW DRUG ADDON: CPT

## 2021-10-07 PROCEDURE — 96374 THER/PROPH/DIAG INJ IV PUSH: CPT

## 2021-10-07 PROCEDURE — 36415 COLL VENOUS BLD VENIPUNCTURE: CPT

## 2021-10-07 PROCEDURE — 72125 CT NECK SPINE W/O DYE: CPT | Mod: 26,ME

## 2021-10-07 PROCEDURE — 85025 COMPLETE CBC W/AUTO DIFF WBC: CPT

## 2021-10-07 PROCEDURE — 99285 EMERGENCY DEPT VISIT HI MDM: CPT

## 2021-10-07 PROCEDURE — 72125 CT NECK SPINE W/O DYE: CPT | Mod: ME

## 2021-10-07 PROCEDURE — 80053 COMPREHEN METABOLIC PANEL: CPT

## 2021-10-07 PROCEDURE — 70450 CT HEAD/BRAIN W/O DYE: CPT | Mod: MF

## 2021-10-07 PROCEDURE — 70450 CT HEAD/BRAIN W/O DYE: CPT | Mod: 26,MF

## 2021-10-07 RX ORDER — METOCLOPRAMIDE HCL 10 MG
10 TABLET ORAL ONCE
Refills: 0 | Status: COMPLETED | OUTPATIENT
Start: 2021-10-07 | End: 2021-10-07

## 2021-10-07 RX ORDER — KETOROLAC TROMETHAMINE 30 MG/ML
15 SYRINGE (ML) INJECTION ONCE
Refills: 0 | Status: DISCONTINUED | OUTPATIENT
Start: 2021-10-07 | End: 2021-10-07

## 2021-10-07 RX ORDER — SODIUM CHLORIDE 9 MG/ML
1000 INJECTION INTRAMUSCULAR; INTRAVENOUS; SUBCUTANEOUS ONCE
Refills: 0 | Status: COMPLETED | OUTPATIENT
Start: 2021-10-07 | End: 2021-10-07

## 2021-10-07 RX ORDER — ACETAMINOPHEN 500 MG
1000 TABLET ORAL ONCE
Refills: 0 | Status: COMPLETED | OUTPATIENT
Start: 2021-10-07 | End: 2021-10-07

## 2021-10-07 RX ORDER — SUMATRIPTAN SUCCINATE 4 MG/.5ML
6 INJECTION, SOLUTION SUBCUTANEOUS ONCE
Refills: 0 | Status: COMPLETED | OUTPATIENT
Start: 2021-10-07 | End: 2021-10-07

## 2021-10-07 RX ADMIN — Medication 15 MILLIGRAM(S): at 11:28

## 2021-10-07 RX ADMIN — Medication 104 MILLIGRAM(S): at 10:58

## 2021-10-07 RX ADMIN — SODIUM CHLORIDE 1000 MILLILITER(S): 9 INJECTION INTRAMUSCULAR; INTRAVENOUS; SUBCUTANEOUS at 10:50

## 2021-10-07 RX ADMIN — SUMATRIPTAN SUCCINATE 6 MILLIGRAM(S): 4 INJECTION, SOLUTION SUBCUTANEOUS at 10:57

## 2021-10-07 RX ADMIN — Medication 1000 MILLIGRAM(S): at 10:58

## 2021-10-07 NOTE — ED ADULT NURSE NOTE - CHIEF COMPLAINT QUOTE
Pt states she hit her head on a metal latch at work 2 days ago and has had increasing headache, photophobia, and dizziness. Denies LOC, vomiting, not on blood thinners. Seen at [facility] and told to follow-up with neurology but reports symptoms are not improving. Ambulating with steady gait, neuro intact.

## 2021-10-07 NOTE — ED PROVIDER NOTE - CONSTITUTIONAL, MLM
normal... Sitting in chair, uncomfortable, closing eyes due to photophobia. awake, alert, oriented to person, place, time/situation and in no apparent distress.

## 2021-10-07 NOTE — ED PROVIDER NOTE - NSFOLLOWUPINSTRUCTIONS_ED_ALL_ED_FT
Ibuprophen , rest     Closed Head Injury    A closed head injury is an injury to your head that may or may not involve a traumatic brain injury (TBI). Symptoms of TBI can be short or long lasting and include headache, dizziness, interference with memory or speech, fatigue, confusion, changes in sleep, mood changes, nausea, depression/anxiety, and dulling of senses. Make sure to obtain proper rest which includes getting plenty of sleep, avoiding excessive visual stimulation, and avoiding activities that may cause physical or mental stress. Avoid any situation where there is potential for another head injury, including sports.    SEEK IMMEDIATE MEDICAL CARE IF YOU HAVE ANY OF THE FOLLOWING SYMPTOMS: unusual drowsiness, vomiting, severe dizziness, seizures, lightheadedness, muscular weakness, different pupil sizes, visual changes, or clear or bloody discharge from your ears or nose.

## 2021-10-07 NOTE — ED ADULT NURSE NOTE - OBJECTIVE STATEMENT
Pt CO HA, photosensitivity, and nausea s/p head injury 2 days ago.  PT states "The latch on a truck hit me on the head."  denies loc, dizziness, thinners, vomiting, diarrhea, sob, Fevers and CP.  No sig pmhx per pt.  Skin to head intact.

## 2021-10-07 NOTE — ED PROVIDER NOTE - ENMT, MLM
12 pm Monday 18th    Airway patent, Nasal mucosa clear. Mouth with normal mucosa. Throat has no vesicles, no oropharyngeal exudates and uvula is midline.

## 2021-10-07 NOTE — ED ADULT TRIAGE NOTE - CHIEF COMPLAINT QUOTE
Pt states she hit her head on a metal latch at work 2 days ago and has had increasing headache, photophobia, and dizziness. Denies LOC, vomiting, not on blood thinners. Seen at [facility] and told to follow-up with neurology but reports symptoms are not improving. Ambulating with steady gait, neuro intact.

## 2021-10-07 NOTE — ED PROVIDER NOTE - CLINICAL SUMMARY MEDICAL DECISION MAKING FREE TEXT BOX
60 y/o F with head trauma. Suspect most likely post concussive syndrome with triggered migraine although due to worsening headache will CT to r/o ICH, lower suspicion. CT neck to r/o cervical fx, lower suspicion. Will treat headache. Dispo pending workup and reevaluation.

## 2021-10-07 NOTE — ED PROVIDER NOTE - PATIENT PORTAL LINK FT
You can access the FollowMyHealth Patient Portal offered by [facility] by registering at the following website: [email or web address]. By joining [employer]’s FollowMyHealth portal, you will also be able to view your health information using other applications (apps) compatible with our system.

## 2021-10-07 NOTE — ED PROVIDER NOTE - OBJECTIVE STATEMENT
60 y/o F, works for [employer]. At work yesterday had a large metal latch of at least 10lbs swing down and hit her in the forehead, causing large lump which she showed a picture of. Lump has since improved with icing although headache worsening and has light sensitivity. Reports headache feels similar to prior migraines, has Hx migraines. No LOC during incident. Endorses neck soreness but no neck pain, no numbness, vision changes, no weakness, no vomiting, no dizziness or difficulty walking.

## 2021-11-20 NOTE — ED ADULT NURSE NOTE - FALLEN IN THE PAST
ENT ISSUE/POD: POD #1 for trach     INTERVAL HPI: 65 y/o male POD #1 for tracheostomy #8 shiley cuffed with surgicel in place 2/2 respiratory failure. Pt is doing well on the vent. No reported issues.      PAST MEDICAL & SURGICAL HISTORY:  HTN (hypertension)    Diabetes mellitus      Allergies    penicillin (Other)    Intolerances      MEDICATIONS  (STANDING):  artificial  tears Solution 1 Drop(s) Both EYES every 6 hours  atorvastatin 40 milliGRAM(s) Oral at bedtime  cefepime   IVPB 2000 milliGRAM(s) IV Intermittent every 8 hours  chlorhexidine 0.12% Liquid 15 milliLiter(s) Oral Mucosa two times a day  chlorhexidine 4% Liquid 1 Application(s) Topical <User Schedule>  dextrose 50% Injectable 25 Gram(s) IV Push once  dextrose 50% Injectable 12.5 Gram(s) IV Push once  dextrose 50% Injectable 25 Gram(s) IV Push once  doxazosin 8 milliGRAM(s) Oral at bedtime  insulin lispro (ADMELOG) corrective regimen sliding scale   SubCutaneous every 6 hours  insulin NPH human recombinant 13 Unit(s) SubCutaneous every 6 hours  labetalol 100 milliGRAM(s) Oral every 8 hours  modafinil 200 milliGRAM(s) Oral daily  pantoprazole  Injectable 40 milliGRAM(s) IV Push daily  polyethylene glycol 3350 17 Gram(s) Oral daily  senna 2 Tablet(s) Oral at bedtime    MEDICATIONS  (PRN):  acetaminophen    Suspension .. 650 milliGRAM(s) Oral every 6 hours PRN Temp greater or equal to 38C (100.4F), Mild Pain (1 - 3)  oxyCODONE    Solution 5 milliGRAM(s) Oral every 4 hours PRN Moderate Pain (4 - 6)  oxyCODONE    Solution 10 milliGRAM(s) Oral every 4 hours PRN Severe Pain (7 - 10)      ROS:   Unable to obtain due to pts clinical condition       Vital Signs Last 24 Hrs  T(C): 37.1 (20 Nov 2021 07:00), Max: 37.7 (20 Nov 2021 03:00)  T(F): 98.7 (20 Nov 2021 07:00), Max: 99.9 (20 Nov 2021 03:00)  HR: 74 (20 Nov 2021 09:41) (65 - 105)  BP: 151/63 (19 Nov 2021 16:02) (151/63 - 151/63)  BP(mean): --  RR: 16 (20 Nov 2021 09:00) (12 - 24)  SpO2: 100% (20 Nov 2021 09:41) (100% - 100%)                          8.3    14.30 )-----------( 316      ( 19 Nov 2021 23:19 )             25.1    11-19    136  |  102  |  35<H>  ----------------------------<  161<H>  4.7   |  23  |  1.18    Ca    8.2<L>      19 Nov 2021 23:19  Phos  3.7     11-19  Mg     2.1     11-19     PT/INR - ( 19 Nov 2021 00:04 )   PT: 12.6 sec;   INR: 1.05 ratio         PTT - ( 19 Nov 2021 00:04 )  PTT:29.3 sec    PHYSICAL EXAM:  Gen: NAD, well-developed  Head: Normocephalic, Atraumatic  Face: no edema/erythema/fluctuance  Eyes:  no scleral injection  Nose: Nares bilaterally patent, no discharge  Mouth: No Stridor / Drooling / Trismus.  Mucosa moist, tongue/uvula midline, oropharynx clear  Neck: #8 shiley inflated cuff and surgicel x1 removed, with minimal serosanguinous oozing from stoma, no active bleeding, sutures x4 and umbilical tie in place.  No lymphadenopathy, trachea midline, no masses  Resp: ventilating well, satting   CV: no peripheral edema/cyanosis           no

## 2022-02-23 NOTE — ED ADULT NURSE NOTE - NSFALLRSKASSESSDT_ED_ALL_ED
Additional Notes: Patient consent was obtained to proceed with the visit and recommended plan of care after discussion of all risks and benefits, including the risks of COVID-19 exposure. Detail Level: Simple 07-Oct-2021 09:29

## 2023-01-26 NOTE — ED PROVIDER NOTE - CONTEXT
Detail Level: Detailed Quality 110: Preventive Care And Screening: Influenza Immunization: Influenza Immunization Administered during Influenza season known (describe)/banged against a door Quality 226: Preventive Care And Screening: Tobacco Use: Screening And Cessation Intervention: Patient screened for tobacco use and is an ex/non-smoker Quality 431: Preventive Care And Screening: Unhealthy Alcohol Use - Screening: Patient not identified as an unhealthy alcohol user when screened for unhealthy alcohol use using a systematic screening method

## 2023-02-08 NOTE — ED ADULT NURSE NOTE - NSFALLRSKHARMRISK_ED_ALL_ED
Blood Pressure Report: Called Pt to obtain home blood pressure reading & or schedule Primary Care Visit. Pt did not answer, left voicemail with callback number.   no

## 2023-09-23 NOTE — ED ADULT NURSE NOTE - NSFALLRSKASSESASSIST_ED_ALL_ED
Charge RN alerted this RN that patient had 6 beats of vtach. Patient was using the bathroom. RN messaged Dr Perez via perfect serve. Pt asymptomatic, states \"Im just tired\" as she was washing hands post void. RN to monitor.   
no

## 2023-12-21 NOTE — ED PROVIDER NOTE - NS ED NOTE AC HIGH RISK COUNTRIES
Patient stable at discharge. Discharge instructions reviewed with patient and patient's wife. IV removed. Tolerated well. Patient taken down by me, the RN, to lobby via wheelchair with oxygen via nasal cannuli 3L. Patient's wife had portable oxygen in the care waiting.   
Problem: Knowledge Deficit  Goal: Patient/family/caregiver demonstrates understanding of disease process, treatment plan, medications, and discharge instructions  Description  Complete learning assessment and assess knowledge base    Interventions:  - Provide teaching at level of understanding  - Provide teaching via preferred learning methods  Outcome: Progressing
No

## 2024-02-12 NOTE — ED PROVIDER NOTE - CROS ED [facility] ALL NEG
[FreeTextEntry1] : Entered by Tiffany Vera, acting as scribe for Dr. Rainer Carter. The documentation recorded by the scribe accurately reflects the service I personally performed and the decisions made by me. 
- - -

## 2024-03-29 NOTE — H&P ADULT - PMH
well developed, well nourished , in no acute distress , ambulating without difficulty , normal communication ability Asthma
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
## 2025-05-25 NOTE — ED PROVIDER NOTE - CONDITION AT DISCHARGE:
Speech Therapy    Patient not seen in therapy.    Unavailable due to sleeping soundly.      Additional details:  SLP attempted to see pt x3 today for bedside swallow evaluation.  Pt sleeping soundly during all attempts and unable to participate.  ST to re-attempt bedside swallow evaluation in 24 hours or as clinically appropriate.  SLP discussed attempts with pt's RN      OBJECTIVE                            Therapy procedure time and total treatment time can be found documented on the Time Entry flowsheet   Improved

## 2025-06-08 ENCOUNTER — EMERGENCY (EMERGENCY)
Facility: HOSPITAL | Age: 65
LOS: 1 days | End: 2025-06-08
Admitting: EMERGENCY MEDICINE
Payer: COMMERCIAL

## 2025-06-08 VITALS
HEART RATE: 82 BPM | TEMPERATURE: 98 F | OXYGEN SATURATION: 98 % | RESPIRATION RATE: 17 BRPM | DIASTOLIC BLOOD PRESSURE: 84 MMHG | WEIGHT: 166.89 LBS | SYSTOLIC BLOOD PRESSURE: 147 MMHG | HEIGHT: 62 IN

## 2025-06-08 DIAGNOSIS — Z98.51 TUBAL LIGATION STATUS: Chronic | ICD-10-CM

## 2025-06-08 PROCEDURE — 73110 X-RAY EXAM OF WRIST: CPT

## 2025-06-08 PROCEDURE — 73110 X-RAY EXAM OF WRIST: CPT | Mod: 26,RT

## 2025-06-08 PROCEDURE — 99284 EMERGENCY DEPT VISIT MOD MDM: CPT

## 2025-06-08 PROCEDURE — 99283 EMERGENCY DEPT VISIT LOW MDM: CPT | Mod: 25

## 2025-06-08 RX ORDER — IBUPROFEN 200 MG
600 TABLET ORAL ONCE
Refills: 0 | Status: COMPLETED | OUTPATIENT
Start: 2025-06-08 | End: 2025-06-08

## 2025-06-08 RX ADMIN — Medication 600 MILLIGRAM(S): at 08:43

## 2025-06-08 NOTE — ED PROVIDER NOTE - CLINICAL SUMMARY MEDICAL DECISION MAKING FREE TEXT BOX
65 F RHD no pmh p/w R wrist pain x few days; after reaching to grab a bag that was falling.  on exam vss, well appearing, RUE: + ttp over distal radius, no deformity or swelling, no snuff box ttp, pain w/ ROM thumb but FROM all digits, SILT, brisk cap refill, 2+ radial pulse.  suspect tendonitis, less likely fx or dislocation but will obtain xray and give analgesia    xray shows no acute pathology.  given thumb spica for tendonitis.  educated on RICE and discussed strict return parameters

## 2025-06-08 NOTE — ED PROVIDER NOTE - PHYSICAL EXAMINATION
Gen: well appearing, no acute distress  Skin: warm/dry, no rash noted  Resp: breathing comfortably, speaking in full sentences, no dyspnea  RUE: + ttp over distal radius, no deformity or swelling, no snuff box ttp, pain w/ ROM thumb but FROM all digits, SILT, brisk cap refill, 2+ radial pulse.   Neuro: alert/oriented, ambulatory

## 2025-06-08 NOTE — ED ADULT TRIAGE NOTE - CHIEF COMPLAINT QUOTE
pt presents to the ED with R wrist pain, patient denies pmh, denies trauma/falls, state that she lifts a lot of heavy boxes at work. Denies numbness/tingling.

## 2025-06-08 NOTE — ED PROVIDER NOTE - NSFOLLOWUPINSTRUCTIONS_ED_ALL_ED_FT
Take tylenol 650mg or motrin 400-800mg as needed every 4-6 hours for pain.   REST- Rest your hurting/injured joint or extremity to decrease pain and swelling for 24-48 hours    ICE- Apply ice to area of pain to decreased inflammation and pain, put towel/barrier between ice and skin. You can keep ice on for 20 minutes at a time 4-8 times daily   COMPRESSION- Wear ace wrap or brace for support to reduce swelling.  Make sure not to wrap too tight, loosen if skin feeling numb/tingling or skin turns blue   ELEVATION- Elevate hurting/injured area 6 or more inches about level of heart to decrease swelling/inflammation.  Use pillow under joint to elevate area    Please call to arrange follow up with primary care doctor within one week    Irritation and Swelling of the Thumb (De Quervain's Syndrome): What to Know  The hand, showing the sheath and swollen tendons from de Quervain's syndrome.  De Quervain's syndrome causes irritation and swelling of the tendon on the thumb side of the wrist. Tendons are strong tissues that connect muscle to bone.    The tendons in the hand go through a tunnel called a sheath. A slippery layer of tissue helps the tendons move through the sheath. With de Quervain's syndrome, the sheath swells or thickens. This can cause pressure and pain.    De Quervain's syndrome is also called de Quervain's disease or de Quervain's tenosynovitis.    What are the causes?  The exact cause isn't known. It may be from overuse of the hand and wrist.    What increases the risk?  You're more likely to get De Quervain's syndrome if:  You use your hands far more than normal. This includes if you do certain movements over and over, such as twisting your hand or using a tight .  You're pregnant.  You're female and middle-aged.  You have rheumatoid arthritis.  You have diabetes.  What are the signs or symptoms?  The main symptom is pain on the thumb side of your wrist. The pain may get worse when you grasp something or turn your wrist. You may also have:  Pain that goes up your forearm.  Swelling of your wrist and hand.  Trouble moving your thumb and wrist.  A feeling of snapping in the wrist.  A cyst. This is a pocket of fluid.  How is this diagnosed?  You may be diagnosed based on your symptoms, medical history, and an exam. The exam may include a Finkelstein test. This is when your health care provider pulls your thumb and wrist to see if it causes pain.    You may also have tests. These may include:  An X-ray.  An MRI.  An ultrasound.  How is this treated?  You may need to:  Avoid doing things that:  Cause pain or swelling.  Cause you to make the same movements with your hand or thumb over and over.  Take medicines. These may include:  Medicines to help with pain.  Steroids to help with irritation and swelling.  Wear a splint.  Have surgery. This may be needed if other treatments don't work.  Once the pain and swelling have gone down, you may start:  Physical therapy. You may be given exercises to help with movement and strength in your wrist and thumb.  Occupational therapy. This includes changing how you move your wrist.  Follow these instructions at home:  If you have a splint:    Wear the splint as told. Take it off only if your provider says you can.  Check the skin around it every day. Tell your provider if you see problems.  Loosen the splint if your fingers tingle, are numb, or turn cold and blue.  Keep the splint clean.  If the splint isn't waterproof:  Do not let it get wet.  Cover it when you take a bath or shower. Use a cover that doesn't let any water in.  Managing pain, stiffness, and swelling    Bag of ice on a towel on the skin.  Use ice or an ice pack as told.  If you have a splint that you can take off, remove it only as told.  Place a towel between your skin and the ice.  Leave the ice on for 20 minutes, 2–3 times a day.  If your skin turns red, take off the ice right away to prevent skin damage. The risk of damage is higher if you can't feel pain, heat, or cold.  Move your fingers often to reduce stiffness and swelling.  Raise your hand above the level of your heart while you're sitting or lying down. Use pillows as needed.  General instructions    Take your medicines only as told.  Ask if it's OK for you to lift.  Ask when it's safe to drive if you have a splint on your hand.  Ask what things are safe for you to do at home. Ask when you can go back to work or school.  Where to find more information  To learn more:  Go to [email or web address]org.  Click "Search."  Type "De Quervain's tenosynovitis" into the search bar.  Contact a health care provider if:  Your pain doesn't get better with medicine.  Your pain gets worse.  You get new symptoms.  This information is not intended to replace advice given to you by your health care provider. Make sure you discuss any questions you have with your health care provider.

## 2025-06-08 NOTE — ED PROVIDER NOTE - OBJECTIVE STATEMENT
65 F RHD no pmh p/w R wrist pain x few days.  pt reports pain over R wrist radiating to the thumb worse w/ movement of the thumb.  reports it started after quickly grabbing a bag that was falling at work.  no direct trauma.  has not taken anything for pain.  denies f/c, skin changes, numbness/weakness, paresthesias, arm swelling or other complaints.

## 2025-06-08 NOTE — ED PROVIDER NOTE - CARE PROVIDER_API CALL
Az Marquez  Plastic Surgery  [address] [place] [id number]  Phone: [phone]  Fax: [phone]  Follow Up Time:    Opt out

## 2025-06-08 NOTE — ED ADULT NURSE REASSESSMENT NOTE - NS ED NURSE REASSESS COMMENT FT1
Pt received from PAULINA Rodriguez. Pt sitting comfortably in chair in [address]. no acute distress or additional complaints. pending XR.

## 2025-06-08 NOTE — ED PROVIDER NOTE - PATIENT PORTAL LINK FT
You can access the FollowMyHealth Patient Portal offered by [facility] by registering at the following website: [email or web address]. By joining [facility]’s FollowMyHealth portal, you will also be able to view your health information using other applications (apps) compatible with our system.

## 2025-06-11 DIAGNOSIS — M25.531 PAIN IN RIGHT WRIST: ICD-10-CM

## 2025-07-31 PROBLEM — Z00.00 ENCOUNTER FOR PREVENTIVE HEALTH EXAMINATION: Status: ACTIVE | Noted: 2025-07-31

## 2025-08-06 ENCOUNTER — APPOINTMENT (OUTPATIENT)
Dept: INTERNAL MEDICINE | Facility: CLINIC | Age: 65
End: 2025-08-06